# Patient Record
Sex: FEMALE | Race: WHITE | NOT HISPANIC OR LATINO | Employment: OTHER | URBAN - METROPOLITAN AREA
[De-identification: names, ages, dates, MRNs, and addresses within clinical notes are randomized per-mention and may not be internally consistent; named-entity substitution may affect disease eponyms.]

---

## 2017-04-03 ENCOUNTER — TELEPHONE (OUTPATIENT)
Dept: HEALTH INFORMATION MANAGEMENT | Facility: OTHER | Age: 82
End: 2017-04-03

## 2017-04-03 ENCOUNTER — PATIENT OUTREACH (OUTPATIENT)
Dept: HEALTH INFORMATION MANAGEMENT | Facility: OTHER | Age: 82
End: 2017-04-03

## 2017-04-03 NOTE — TELEPHONE ENCOUNTER
Patient is over the age of 65 and showing overdue for Pap Smear and Mammogram.    Please reply to this message as to whether these tests are appropriate and I will update the Health Maintenance Topic or contact the patient to schedule.

## 2017-04-27 ENCOUNTER — HOSPITAL ENCOUNTER (OUTPATIENT)
Dept: LAB | Facility: MEDICAL CENTER | Age: 82
End: 2017-04-27
Attending: NURSE PRACTITIONER
Payer: MEDICARE

## 2017-04-27 LAB
25(OH)D3 SERPL-MCNC: 17 NG/ML (ref 30–100)
ALBUMIN SERPL BCP-MCNC: 4 G/DL (ref 3.2–4.9)
ALBUMIN/GLOB SERPL: 1.4 G/DL
ALP SERPL-CCNC: 50 U/L (ref 30–99)
ALT SERPL-CCNC: 16 U/L (ref 2–50)
ANION GAP SERPL CALC-SCNC: 8 MMOL/L (ref 0–11.9)
AST SERPL-CCNC: 17 U/L (ref 12–45)
BASOPHILS # BLD AUTO: 0.8 % (ref 0–1.8)
BASOPHILS # BLD: 0.05 K/UL (ref 0–0.12)
BILIRUB SERPL-MCNC: 0.6 MG/DL (ref 0.1–1.5)
BUN SERPL-MCNC: 18 MG/DL (ref 8–22)
CALCIUM SERPL-MCNC: 9.4 MG/DL (ref 8.5–10.5)
CHLORIDE SERPL-SCNC: 108 MMOL/L (ref 96–112)
CHOLEST SERPL-MCNC: 205 MG/DL (ref 100–199)
CO2 SERPL-SCNC: 25 MMOL/L (ref 20–33)
CREAT SERPL-MCNC: 0.82 MG/DL (ref 0.5–1.4)
EOSINOPHIL # BLD AUTO: 0.11 K/UL (ref 0–0.51)
EOSINOPHIL NFR BLD: 1.7 % (ref 0–6.9)
ERYTHROCYTE [DISTWIDTH] IN BLOOD BY AUTOMATED COUNT: 46.7 FL (ref 35.9–50)
EST. AVERAGE GLUCOSE BLD GHB EST-MCNC: 114 MG/DL
FOLATE SERPL-MCNC: >23.3 NG/ML
GFR SERPL CREATININE-BSD FRML MDRD: >60 ML/MIN/1.73 M 2
GLOBULIN SER CALC-MCNC: 2.8 G/DL (ref 1.9–3.5)
GLUCOSE SERPL-MCNC: 98 MG/DL (ref 65–99)
HBA1C MFR BLD: 5.6 % (ref 0–5.6)
HCT VFR BLD AUTO: 41.8 % (ref 37–47)
HDLC SERPL-MCNC: 35 MG/DL
HGB BLD-MCNC: 14.2 G/DL (ref 12–16)
IMM GRANULOCYTES # BLD AUTO: 0.03 K/UL (ref 0–0.11)
IMM GRANULOCYTES NFR BLD AUTO: 0.5 % (ref 0–0.9)
LDLC SERPL CALC-MCNC: 134 MG/DL
LYMPHOCYTES # BLD AUTO: 2.45 K/UL (ref 1–4.8)
LYMPHOCYTES NFR BLD: 37.6 % (ref 22–41)
MCH RBC QN AUTO: 32.2 PG (ref 27–33)
MCHC RBC AUTO-ENTMCNC: 34 G/DL (ref 33.6–35)
MCV RBC AUTO: 94.8 FL (ref 81.4–97.8)
MONOCYTES # BLD AUTO: 0.49 K/UL (ref 0–0.85)
MONOCYTES NFR BLD AUTO: 7.5 % (ref 0–13.4)
NEUTROPHILS # BLD AUTO: 3.38 K/UL (ref 2–7.15)
NEUTROPHILS NFR BLD: 51.9 % (ref 44–72)
NRBC # BLD AUTO: 0 K/UL
NRBC BLD AUTO-RTO: 0 /100 WBC
PLATELET # BLD AUTO: 160 K/UL (ref 164–446)
PMV BLD AUTO: 11.6 FL (ref 9–12.9)
POTASSIUM SERPL-SCNC: 4.2 MMOL/L (ref 3.6–5.5)
PROT SERPL-MCNC: 6.8 G/DL (ref 6–8.2)
RBC # BLD AUTO: 4.41 M/UL (ref 4.2–5.4)
SODIUM SERPL-SCNC: 141 MMOL/L (ref 135–145)
T3FREE SERPL-MCNC: 2.81 PG/ML (ref 2.4–4.2)
T4 FREE SERPL-MCNC: 0.85 NG/DL (ref 0.53–1.43)
TRIGL SERPL-MCNC: 178 MG/DL (ref 0–149)
TSH SERPL DL<=0.005 MIU/L-ACNC: 3.91 UIU/ML (ref 0.3–3.7)
VIT B12 SERPL-MCNC: 711 PG/ML (ref 211–911)
WBC # BLD AUTO: 6.5 K/UL (ref 4.8–10.8)

## 2017-04-27 PROCEDURE — 83036 HEMOGLOBIN GLYCOSYLATED A1C: CPT

## 2017-04-27 PROCEDURE — 82607 VITAMIN B-12: CPT

## 2017-04-27 PROCEDURE — 84443 ASSAY THYROID STIM HORMONE: CPT

## 2017-04-27 PROCEDURE — 85025 COMPLETE CBC W/AUTO DIFF WBC: CPT

## 2017-04-27 PROCEDURE — 82306 VITAMIN D 25 HYDROXY: CPT

## 2017-04-27 PROCEDURE — 36415 COLL VENOUS BLD VENIPUNCTURE: CPT

## 2017-04-27 PROCEDURE — 80061 LIPID PANEL: CPT

## 2017-04-27 PROCEDURE — 84481 FREE ASSAY (FT-3): CPT

## 2017-04-27 PROCEDURE — 82746 ASSAY OF FOLIC ACID SERUM: CPT

## 2017-04-27 PROCEDURE — 80053 COMPREHEN METABOLIC PANEL: CPT

## 2017-04-27 PROCEDURE — 84439 ASSAY OF FREE THYROXINE: CPT

## 2018-01-24 ENCOUNTER — OFFICE VISIT (OUTPATIENT)
Dept: BEHAVIORAL HEALTH | Facility: PHYSICIAN GROUP | Age: 83
End: 2018-01-24
Payer: MEDICARE

## 2018-01-24 DIAGNOSIS — F41.8 SITUATIONAL ANXIETY: ICD-10-CM

## 2018-01-24 PROCEDURE — 90791 PSYCH DIAGNOSTIC EVALUATION: CPT | Performed by: SOCIAL WORKER
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
## 2018-02-15 ENCOUNTER — OFFICE VISIT (OUTPATIENT)
Dept: INTERNAL MEDICINE | Facility: MEDICAL CENTER | Age: 83
End: 2018-02-15
Payer: MEDICARE

## 2018-02-15 VITALS
SYSTOLIC BLOOD PRESSURE: 142 MMHG | HEART RATE: 72 BPM | HEIGHT: 65 IN | WEIGHT: 209.25 LBS | TEMPERATURE: 97.7 F | OXYGEN SATURATION: 93 % | DIASTOLIC BLOOD PRESSURE: 74 MMHG | BODY MASS INDEX: 34.86 KG/M2

## 2018-02-15 DIAGNOSIS — E55.9 VITAMIN D DEFICIENCY: ICD-10-CM

## 2018-02-15 DIAGNOSIS — R03.0 ELEVATED BLOOD PRESSURE READING: ICD-10-CM

## 2018-02-15 DIAGNOSIS — F33.0 MILD EPISODE OF RECURRENT MAJOR DEPRESSIVE DISORDER (HCC): ICD-10-CM

## 2018-02-15 DIAGNOSIS — Z12.39 SCREENING FOR BREAST CANCER: ICD-10-CM

## 2018-02-15 DIAGNOSIS — Z00.00 HEALTHCARE MAINTENANCE: ICD-10-CM

## 2018-02-15 DIAGNOSIS — Z13.820 SCREENING FOR OSTEOPOROSIS: ICD-10-CM

## 2018-02-15 DIAGNOSIS — G31.84 MCI (MILD COGNITIVE IMPAIRMENT): ICD-10-CM

## 2018-02-15 DIAGNOSIS — E66.09 CLASS 2 OBESITY DUE TO EXCESS CALORIES WITHOUT SERIOUS COMORBIDITY IN ADULT, UNSPECIFIED BMI: ICD-10-CM

## 2018-02-15 PROBLEM — F32.A DEPRESSION: Status: ACTIVE | Noted: 2018-02-15

## 2018-02-15 PROCEDURE — 99214 OFFICE O/P EST MOD 30 MIN: CPT | Performed by: INTERNAL MEDICINE

## 2018-02-15 RX ORDER — ASCORBIC ACID 500 MG
500 TABLET ORAL DAILY
COMMUNITY
End: 2021-07-20

## 2018-02-15 RX ORDER — PHENOL 1.4 %
AEROSOL, SPRAY (ML) MUCOUS MEMBRANE DAILY
COMMUNITY
End: 2021-07-20

## 2018-02-15 ASSESSMENT — PATIENT HEALTH QUESTIONNAIRE - PHQ9
SUM OF ALL RESPONSES TO PHQ QUESTIONS 1-9: 5
CLINICAL INTERPRETATION OF PHQ2 SCORE: 1
5. POOR APPETITE OR OVEREATING: 3 - NEARLY EVERY DAY

## 2018-02-15 ASSESSMENT — PAIN SCALES - GENERAL: PAINLEVEL: 8=MODERATE-SEVERE PAIN

## 2018-02-15 NOTE — PROGRESS NOTES
Geriatric Clinic Note  Patient: Kayode Winters  Age: 84 y.o.   Gender: female  Author: Kameron Benson M.D.  PCP: Kameron Benson M.D.    Today's date: 02/15/18  Chief Complaint   Patient presents with   • Establish Care   • Anxiety     Per pt she don't think she has but her dtrs do.    • Hip Pain     Left. Soreness. since 07/11/2013       HPI:  History obtained from patient, medical chart.  Coming in to [facility] as her daughters wanted to find her her a new provider.  She tells me her daughters think she has anxiety, however, she does not agree. She told me multiple times during the interview that she has lots of things to do and when asked what they were she told me they are painting her house, but she does not have to do any of this. She tells me she has been sad since   Saw Xiomy in behavioral health, visit went well, no other remarks about the visit, I am unable to review the note.    Endorses Good sleep,  Good appetite, Good interst in things. Good concentration, Feels guilty about not getting things done.   No psychotmotor agitaiton.   No thoughts of hurting herself.   Went to [facility], will request records    She is  Joh[employer] witness, she reads a lot. Just read a book on queen vicotria.    Not taking any pain medications currently.     Chronic Conditions:      ROS:  A 14 point ROS is negative, other than as stated above.     Past Medical History:   Diagnosis Date   • Hip pain 1/22/2014     Social History     Social History   • Marital status:      Spouse name: N/A   • Number of children: N/A   • Years of education: N/A     Occupational History   • Not on file.     Social History Main Topics   • Smoking status: Never Smoker   • Smokeless tobacco: Never Used   • Alcohol use 0.0 oz/week      Comment: rare.    • Drug use: Unknown   • Sexual activity: Not on file     Other Topics Concern   • Not on file     Social History Narrative   • No narrative on file     Family History   Problem  "Relation Age of Onset   • Depression Sister    • Genetic Neg Hx      Allergies: Novocain; Other food; Peanut-derived; and Red dye #40 [fd&c red #40]  No current outpatient prescriptions on file prior to visit.     No current facility-administered medications on file prior to visit.        Physical Exam:  /74   Pulse 72   Temp 36.5 °C (97.7 °F)   Ht 1.651 m (5' 5\")   Wt 94.9 kg (209 lb 4 oz)   SpO2 93%   BMI 34.82 kg/m²   Gen:NAD  HEENT:neck supple  CV:RRR 2+ radial pulses bilaterally  Lungs: normal Effort CAB  Abd:obese  Ext: no LE edema  Neuro: non focal  Psych: Does not appear to be responding to internal stimuli, anxious, not overtly depressed, affect normal.   Dementia Screening: MOCA 24/30 - deficits mainly in language and mild attention issues. She seemed to feel that she would not remember the delayed recall, but after encouragement she remember 4/5.  Finished some college    Delirium Screening. Negative    Labs: April TSH noted with normal T4, April CBC, and CMP WNL, lipid panel reviewed, b12 level and vitamin D level noted.     Assessment: 83 y/o F with [facility] based on MOCA today and family concern for anxiety, which may be depression    Plan:  [facility] - MOCA 24, independent in ADLs and IADls per patient report. Has family involved. Labs in April do not suggest reversible cause, but will recheck. Given her families perception of anxiety and guilt she may benefit from pharmacological therapy , along with her CBT that she is getting. Ask patient to consider SSRI and return in 4 weeks to discuss.  -continue CBT    Elevated BP - requested she take her BP at home, log provided. Most recent BP (2016/2015) within desired limits.     Vitamin D def - taking home supplement, encouraged cntinued use. Updated med record after patient left.     Obesity - encourage exercise and weight loss, her goal BMI is between 25-30.    Health care - underwent shared decision making and decided to continue to screen for breast " cancer, cholesterol, and osteoporosis.     Return in about 4 weeks (around 3/15/2018) for Long, discuss mood and lab results. .     Kameron Benson M.D.  Geriatric Physician

## 2018-02-15 NOTE — PATIENT INSTRUCTIONS
It was nice to meet you today.   Please continue to take your vitamin D.   We ordered some blood work and some imaging tests. Please do your best to get those at your earliest convinience. Next week is fine.     I think a mood medication may help. I would consider starting citalopram or escitalopram for this. Talk your daughters about this.     Check your blood pressure at home. Use the log I gave you and bring it to your next visit.     I recommend getting 1200mg of calcium in your diet.    If you are unable to do this, then, it's ok to supplement the rest.    Ideally you should take no more than 500 mg of Calcium at a time.   Here is a list of calcium rich foods (UpToDate, 2016):

## 2018-02-20 PROBLEM — E55.9 VITAMIN D DEFICIENCY: Status: ACTIVE | Noted: 2018-02-20

## 2018-02-26 ENCOUNTER — HOSPITAL ENCOUNTER (OUTPATIENT)
Dept: LAB | Facility: MEDICAL CENTER | Age: 83
End: 2018-02-26
Attending: INTERNAL MEDICINE
Payer: MEDICARE

## 2018-02-26 DIAGNOSIS — E66.09 CLASS 2 OBESITY DUE TO EXCESS CALORIES WITHOUT SERIOUS COMORBIDITY IN ADULT, UNSPECIFIED BMI: ICD-10-CM

## 2018-02-26 DIAGNOSIS — F33.0 MILD EPISODE OF RECURRENT MAJOR DEPRESSIVE DISORDER (HCC): ICD-10-CM

## 2018-02-26 DIAGNOSIS — Z00.00 HEALTHCARE MAINTENANCE: ICD-10-CM

## 2018-02-26 DIAGNOSIS — R03.0 ELEVATED BLOOD PRESSURE READING: ICD-10-CM

## 2018-02-26 LAB
25(OH)D3 SERPL-MCNC: 26 NG/ML (ref 30–100)
ALBUMIN SERPL BCP-MCNC: 4.2 G/DL (ref 3.2–4.9)
ALBUMIN/GLOB SERPL: 1.5 G/DL
ALP SERPL-CCNC: 43 U/L (ref 30–99)
ALT SERPL-CCNC: 15 U/L (ref 2–50)
ANION GAP SERPL CALC-SCNC: 10 MMOL/L (ref 0–11.9)
AST SERPL-CCNC: 15 U/L (ref 12–45)
BASOPHILS # BLD AUTO: 0.6 % (ref 0–1.8)
BASOPHILS # BLD: 0.04 K/UL (ref 0–0.12)
BILIRUB SERPL-MCNC: 0.6 MG/DL (ref 0.1–1.5)
BUN SERPL-MCNC: 21 MG/DL (ref 8–22)
CALCIUM SERPL-MCNC: 9.2 MG/DL (ref 8.5–10.5)
CHLORIDE SERPL-SCNC: 107 MMOL/L (ref 96–112)
CHOLEST SERPL-MCNC: 201 MG/DL (ref 100–199)
CO2 SERPL-SCNC: 23 MMOL/L (ref 20–33)
CREAT SERPL-MCNC: 0.96 MG/DL (ref 0.5–1.4)
EOSINOPHIL # BLD AUTO: 0.07 K/UL (ref 0–0.51)
EOSINOPHIL NFR BLD: 1 % (ref 0–6.9)
ERYTHROCYTE [DISTWIDTH] IN BLOOD BY AUTOMATED COUNT: 48.2 FL (ref 35.9–50)
GLOBULIN SER CALC-MCNC: 2.8 G/DL (ref 1.9–3.5)
GLUCOSE SERPL-MCNC: 93 MG/DL (ref 65–99)
HCT VFR BLD AUTO: 43.7 % (ref 37–47)
HDLC SERPL-MCNC: 32 MG/DL
HGB BLD-MCNC: 14.2 G/DL (ref 12–16)
IMM GRANULOCYTES # BLD AUTO: 0.02 K/UL (ref 0–0.11)
IMM GRANULOCYTES NFR BLD AUTO: 0.3 % (ref 0–0.9)
LDLC SERPL CALC-MCNC: 126 MG/DL
LYMPHOCYTES # BLD AUTO: 2.71 K/UL (ref 1–4.8)
LYMPHOCYTES NFR BLD: 40.3 % (ref 22–41)
MCH RBC QN AUTO: 31.7 PG (ref 27–33)
MCHC RBC AUTO-ENTMCNC: 32.5 G/DL (ref 33.6–35)
MCV RBC AUTO: 97.5 FL (ref 81.4–97.8)
MONOCYTES # BLD AUTO: 0.5 K/UL (ref 0–0.85)
MONOCYTES NFR BLD AUTO: 7.4 % (ref 0–13.4)
NEUTROPHILS # BLD AUTO: 3.38 K/UL (ref 2–7.15)
NEUTROPHILS NFR BLD: 50.4 % (ref 44–72)
NRBC # BLD AUTO: 0 K/UL
NRBC BLD-RTO: 0 /100 WBC
PLATELET # BLD AUTO: 149 K/UL (ref 164–446)
PMV BLD AUTO: 11.5 FL (ref 9–12.9)
POTASSIUM SERPL-SCNC: 4.3 MMOL/L (ref 3.6–5.5)
PROT SERPL-MCNC: 7 G/DL (ref 6–8.2)
RBC # BLD AUTO: 4.48 M/UL (ref 4.2–5.4)
SODIUM SERPL-SCNC: 140 MMOL/L (ref 135–145)
TRIGL SERPL-MCNC: 216 MG/DL (ref 0–149)
TSH SERPL DL<=0.005 MIU/L-ACNC: 4.66 UIU/ML (ref 0.38–5.33)
WBC # BLD AUTO: 6.7 K/UL (ref 4.8–10.8)

## 2018-02-26 PROCEDURE — 84443 ASSAY THYROID STIM HORMONE: CPT

## 2018-02-26 PROCEDURE — 80053 COMPREHEN METABOLIC PANEL: CPT

## 2018-02-26 PROCEDURE — 80061 LIPID PANEL: CPT

## 2018-02-26 PROCEDURE — 82306 VITAMIN D 25 HYDROXY: CPT

## 2018-02-26 PROCEDURE — 85025 COMPLETE CBC W/AUTO DIFF WBC: CPT

## 2018-02-26 PROCEDURE — 36415 COLL VENOUS BLD VENIPUNCTURE: CPT

## 2018-03-09 ENCOUNTER — OFFICE VISIT (OUTPATIENT)
Dept: BEHAVIORAL HEALTH | Facility: PHYSICIAN GROUP | Age: 83
End: 2018-03-09
Payer: MEDICARE

## 2018-03-09 DIAGNOSIS — F41.8 SITUATIONAL ANXIETY: ICD-10-CM

## 2018-03-09 PROCEDURE — 90834 PSYTX W PT 45 MINUTES: CPT | Performed by: SOCIAL WORKER

## 2018-03-20 ENCOUNTER — OFFICE VISIT (OUTPATIENT)
Dept: INTERNAL MEDICINE | Facility: MEDICAL CENTER | Age: 83
End: 2018-03-20
Payer: MEDICARE

## 2018-03-20 VITALS
BODY MASS INDEX: 35.24 KG/M2 | DIASTOLIC BLOOD PRESSURE: 76 MMHG | HEART RATE: 72 BPM | OXYGEN SATURATION: 95 % | HEIGHT: 65 IN | TEMPERATURE: 98.3 F | SYSTOLIC BLOOD PRESSURE: 150 MMHG | WEIGHT: 211.5 LBS

## 2018-03-20 DIAGNOSIS — F33.0 MILD EPISODE OF RECURRENT MAJOR DEPRESSIVE DISORDER (HCC): ICD-10-CM

## 2018-03-20 DIAGNOSIS — D69.6 THROMBOCYTOPENIA (HCC): ICD-10-CM

## 2018-03-20 DIAGNOSIS — R03.0 ELEVATED BLOOD PRESSURE READING: ICD-10-CM

## 2018-03-20 DIAGNOSIS — E55.9 VITAMIN D DEFICIENCY: ICD-10-CM

## 2018-03-20 PROCEDURE — 99214 OFFICE O/P EST MOD 30 MIN: CPT | Performed by: INTERNAL MEDICINE

## 2018-03-20 ASSESSMENT — PATIENT HEALTH QUESTIONNAIRE - PHQ9
5. POOR APPETITE OR OVEREATING: 2 - MORE THAN HALF THE DAYS
SUM OF ALL RESPONSES TO PHQ QUESTIONS 1-9: 6
CLINICAL INTERPRETATION OF PHQ2 SCORE: 0

## 2018-03-20 NOTE — PATIENT INSTRUCTIONS
Bring in your blood pressure cuff so we can make sure it works. If it does not work correctly you will need to get a new cuff and repeat the blood pressure log.     You need to start to exercise or walk your dog some more. This will help with your mood and your blood pressure.     Go get your blood work and I will let you know the results.

## 2018-03-21 NOTE — PROGRESS NOTES
Geriatric Clinic Note  Patient: Kayode Winters  Age: 84 y.o.   Gender: female  Author: Kameron Benson M.D.  PCP: Kameron Benson M.D.    Today's date: 03/21/18  Chief Complaint   Patient presents with   • Anxiety     Per pt she says her kids thinks she has an anxiety disorder   • Hypertension       HPI:  History obtained from patient, medical chart.    Patient is coming in for follow-up. I met her a month ago and she told me that her daughter thought she had a lot of anxiety. When I asked her about that today, she tells me that she feels that things are going okay with her anxiety. She is continuing to read and is finding enjoyment out of that. We did a PHQ9 on her and the MA documented the results. But upon discussion of the things that bother her the most she always feels like she just doesn't have enough time or is not doing things as well as that she could or used. She does not really want to start any medications for her mood at this point. She continues follow with behavioral health.     Chronic Conditions:  Thrombocytopenia - denies any bleeding hematochezia melena  Vitamin D def/post menopausal at risk for osteoporosis - pending DEXA scan and continues on vitamin D supplement.     ROS:  A 14 point ROS is negative, other than as stated above.     Past Medical History:   Diagnosis Date   • Hip pain 1/22/2014     Social History     Social History   • Marital status:      Spouse name: N/A   • Number of children: N/A   • Years of education: 2 year college     Occupational History   • Medical Technician      Social History Main Topics   • Smoking status: Never Smoker   • Smokeless tobacco: Never Used   • Alcohol use 0.0 oz/week      Comment: rare.    • Drug use: Unknown   • Sexual activity: Not on file     Other Topics Concern   • Not on file     Social History Narrative    Christianity    Walks twice a week for one hour at a time.     Lives alone, but family lives on the same block.      Family  "History   Problem Relation Age of Onset   • Depression Sister    • Genetic Neg Hx      Allergies: Novocain; Other food; Peanut-derived; and Red dye #40 [fd&c red #40]  Current Outpatient Prescriptions on File Prior to Visit   Medication Sig Dispense Refill   • vitamin D (CHOLECALCIFEROL) 1000 UNIT Tab Take 1 Tab by mouth every day. 60 Tab    • Multiple Vitamins-Minerals (MULTIVITAMIN ADULTS 50+) Tab Take  by mouth every day.     • ascorbic acid (ASCORBIC ACID) 500 MG Tab Take 500 mg by mouth every day.       No current facility-administered medications on file prior to visit.        Physical Exam:  /76   Pulse 72   Temp 36.8 °C (98.3 °F)   Ht 1.651 m (5' 5\")   Wt 95.9 kg (211 lb 8 oz)   SpO2 95%   BMI 35.20 kg/m² Body mass index is 35.2 kg/m².    Gen: NAD, some mild memory issues.   HEENT: neck supple  CV:RRR no mrmur  Lungs:CAB, normal effort  Abd:,  Ext: no c/c/e  Neuro: non focal, no sensory deficits noted  Psych: Does not appear to be responding to internal stimuli, not overtyly depressed  Dementia Screening:  Delirium Screening.      Assessment:    Plan:  1. Thrombocytopenia (CMS-HCC)  Very mild. We'll repeat CBC and check her iron levels. Counseled on bleeding precautions   CBC WITH DIFFERENTIAL    IRON/TOTAL IRON BIND    FERRITIN   2. 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 Patient's symptoms are stable she does not want to start a medication. I encouraged her to exercise again in the sun; help with her mood she continues to follow with behavioral health    3. Vitamin D deficiency  Continue replacement    4. Elevated blood pressure reading  Her blood pressure cuff is from her late 's and is quite old. I requested she get a new one. I put in a DME order that perhaps she can get one provided tumor free of charge if she qualifies I gave her a blood pressure log and requested that she bring it in at the next visit. I considered ambulatory blood " pressure monitoring however it is difficult to order her current system so we will make sure she gets a better machine and then continue on from there.      Healthcare maintenance  Requested she get the mammogram and bone density scan ordered at last visit. We did not discuss vaccines today  Return if symptoms worsen or fail to improve, for Long, BP/anxiety f/u. Follow-up in 2 months otherwise; requested that she bring a family with history of failure available    Kameron Benson M.D.  Geriatric Physician    This note was partially dictated with voice recognition software, for any confusion please do not hesitate to contact me.

## 2018-03-26 ENCOUNTER — HOSPITAL ENCOUNTER (OUTPATIENT)
Dept: LAB | Facility: MEDICAL CENTER | Age: 83
End: 2018-03-26
Attending: INTERNAL MEDICINE
Payer: MEDICARE

## 2018-03-26 DIAGNOSIS — D69.6 THROMBOCYTOPENIA (HCC): ICD-10-CM

## 2018-03-26 LAB
BASOPHILS # BLD AUTO: 0.4 % (ref 0–1.8)
BASOPHILS # BLD: 0.03 K/UL (ref 0–0.12)
EOSINOPHIL # BLD AUTO: 0.09 K/UL (ref 0–0.51)
EOSINOPHIL NFR BLD: 1.2 % (ref 0–6.9)
ERYTHROCYTE [DISTWIDTH] IN BLOOD BY AUTOMATED COUNT: 47.9 FL (ref 35.9–50)
FERRITIN SERPL-MCNC: 158 NG/ML (ref 10–291)
HCT VFR BLD AUTO: 42.1 % (ref 37–47)
HGB BLD-MCNC: 14 G/DL (ref 12–16)
IMM GRANULOCYTES # BLD AUTO: 0.02 K/UL (ref 0–0.11)
IMM GRANULOCYTES NFR BLD AUTO: 0.3 % (ref 0–0.9)
IRON SATN MFR SERPL: 34 % (ref 15–55)
IRON SERPL-MCNC: 111 UG/DL (ref 40–170)
LYMPHOCYTES # BLD AUTO: 3.14 K/UL (ref 1–4.8)
LYMPHOCYTES NFR BLD: 43.6 % (ref 22–41)
MCH RBC QN AUTO: 32 PG (ref 27–33)
MCHC RBC AUTO-ENTMCNC: 33.3 G/DL (ref 33.6–35)
MCV RBC AUTO: 96.3 FL (ref 81.4–97.8)
MONOCYTES # BLD AUTO: 0.51 K/UL (ref 0–0.85)
MONOCYTES NFR BLD AUTO: 7.1 % (ref 0–13.4)
NEUTROPHILS # BLD AUTO: 3.42 K/UL (ref 2–7.15)
NEUTROPHILS NFR BLD: 47.4 % (ref 44–72)
NRBC # BLD AUTO: 0 K/UL
NRBC BLD-RTO: 0 /100 WBC
PLATELET # BLD AUTO: 177 K/UL (ref 164–446)
PMV BLD AUTO: 10.6 FL (ref 9–12.9)
RBC # BLD AUTO: 4.37 M/UL (ref 4.2–5.4)
TIBC SERPL-MCNC: 330 UG/DL (ref 250–450)
WBC # BLD AUTO: 7.2 K/UL (ref 4.8–10.8)

## 2018-03-26 PROCEDURE — 83540 ASSAY OF IRON: CPT

## 2018-03-26 PROCEDURE — 36415 COLL VENOUS BLD VENIPUNCTURE: CPT

## 2018-03-26 PROCEDURE — 83550 IRON BINDING TEST: CPT

## 2018-03-26 PROCEDURE — 82728 ASSAY OF FERRITIN: CPT

## 2018-03-26 PROCEDURE — 85025 COMPLETE CBC W/AUTO DIFF WBC: CPT

## 2018-08-06 ENCOUNTER — OFFICE VISIT (OUTPATIENT)
Dept: INTERNAL MEDICINE | Facility: MEDICAL CENTER | Age: 83
End: 2018-08-06
Payer: MEDICARE

## 2018-08-06 VITALS
WEIGHT: 202 LBS | SYSTOLIC BLOOD PRESSURE: 142 MMHG | HEIGHT: 65 IN | OXYGEN SATURATION: 95 % | TEMPERATURE: 98.2 F | DIASTOLIC BLOOD PRESSURE: 78 MMHG | BODY MASS INDEX: 33.66 KG/M2 | HEART RATE: 63 BPM

## 2018-08-06 DIAGNOSIS — R03.0 ELEVATED BLOOD PRESSURE READING: ICD-10-CM

## 2018-08-06 PROCEDURE — 99213 OFFICE O/P EST LOW 20 MIN: CPT | Mod: GE | Performed by: INTERNAL MEDICINE

## 2018-08-06 NOTE — PROGRESS NOTES
"      Established Patient    Kayode presents today with the following:    CC: Blood pressure check  PCP: Dr Nica Benson MD      HPI:Ms. Winters presents today with her son for follow-up on blood pressure. She had checked blood pressures at home frequently. She did not bring the blood pressure log today but states that highest number she has seen was 152/86. Usually the systolic blood pressure is around 140s and occasionally 150s. She denies any headache, syncope, chest pain. No prior history of high blood pressure treated with medications. Denies any history of heart disease, stroke, PVD.   her son also states that when she is not in a familiar environment she gets anxious, agitated/confused, but she does well at home. She denies worsening depression or anxiety. She was seen by a therapist before and she stopped seeing her since it does not helping much. She is able to perform her ADLs/IADLs. Her son states that they haven't noted any memory issues.     Patient Active Problem List    Diagnosis Date Noted   • Thrombocytopenia (HCC) 03/20/2018   • Patient is Episcopalian 02/20/2018   • Vitamin D deficiency 02/20/2018   • Depression 02/15/2018   • Elevated blood pressure reading 02/15/2018   • Low back pain 10/25/2016   • Hip pain 01/22/2014   • Obesity 01/22/2014       Current Outpatient Prescriptions   Medication Sig Dispense Refill   • vitamin D (CHOLECALCIFEROL) 1000 UNIT Tab Take 1 Tab by mouth every day. 60 Tab    • Multiple Vitamins-Minerals (MULTIVITAMIN ADULTS 50+) Tab Take  by mouth every day.     • ascorbic acid (ASCORBIC ACID) 500 MG Tab Take 500 mg by mouth every day.       No current facility-administered medications for this visit.        ROS: As per HPI. Additional pertinent symptoms as noted below.    /78   Pulse 63   Temp 36.8 °C (98.2 °F)   Ht 1.651 m (5' 5\")   Wt 91.6 kg (202 lb)   SpO2 95%   BMI 33.61 kg/m²     Physical Exam   Constitutional:  oriented to person, place, and time. No " distress.   Eyes: Pupils are equal, round, and reactive to light. No scleral icterus.  Neck: Neck supple. No thyromegaly present.   Cardiovascular: Normal rate, regular rhythm and normal heart sounds.  Exam reveals no gallop and no friction rub.  No murmur heard.  Pulmonary/Chest: Breath sounds normal. Chest wall is not dull to percussion.   Musculoskeletal:   no edema.   Lymphadenopathy: no cervical adenopathy  Neurological: alert and oriented to person, place, and time.   Skin: No cyanosis. Nails show no clubbing.    Note: I have reviewed all pertinent labs and diagnostic tests associated with this visit with specific comments listed under the assessment and plan below    Assessment and Plan    1. Elevated blood pressure reading  She had one blood pressure reading at the office during previous visit 150/76 millimeters mercury. Her home blood pressure readings mostly on 140s with occasional 150s highest being 152.   Since she does not have major comorbidities (CAD/stroke/CKD/ diabetes) attempting to lower her blood pressure will be riskier than beneficial.   Discussed this with patient and she agrees with lifestyle modification including low salt diet.   Recommended to continue to keep the blood pressure log and bring it during next visit.   Follow-up with PCP in 3 months    Discussed behavioral changes in regards to anxiety in unfamiliar environment. Follow up with PCP.     Followup: Return in about 3 months (around 11/6/2018). With PCP      Signed by: Dawson Martinez M.D.

## 2018-11-12 ENCOUNTER — OFFICE VISIT (OUTPATIENT)
Dept: INTERNAL MEDICINE | Facility: MEDICAL CENTER | Age: 83
End: 2018-11-12
Payer: MEDICARE

## 2018-11-12 VITALS
HEART RATE: 74 BPM | SYSTOLIC BLOOD PRESSURE: 126 MMHG | BODY MASS INDEX: 33.49 KG/M2 | DIASTOLIC BLOOD PRESSURE: 66 MMHG | TEMPERATURE: 98.4 F | OXYGEN SATURATION: 96 % | HEIGHT: 65 IN | WEIGHT: 201 LBS

## 2018-11-12 DIAGNOSIS — K08.89 PAIN IN TOOTH: ICD-10-CM

## 2018-11-12 DIAGNOSIS — E66.09 CLASS 2 OBESITY DUE TO EXCESS CALORIES WITHOUT SERIOUS COMORBIDITY IN ADULT, UNSPECIFIED BMI: ICD-10-CM

## 2018-11-12 DIAGNOSIS — E55.9 VITAMIN D DEFICIENCY: ICD-10-CM

## 2018-11-12 DIAGNOSIS — R03.0 ELEVATED BLOOD PRESSURE READING: ICD-10-CM

## 2018-11-12 DIAGNOSIS — D69.6 THROMBOCYTOPENIA (HCC): ICD-10-CM

## 2018-11-12 PROCEDURE — 99214 OFFICE O/P EST MOD 30 MIN: CPT | Performed by: INTERNAL MEDICINE

## 2018-11-12 RX ORDER — AMOXICILLIN 500 MG/1
CAPSULE ORAL
Refills: 0 | COMMUNITY
Start: 2018-10-10 | End: 2021-07-20

## 2018-11-12 NOTE — PROGRESS NOTES
Geriatric Clinic Note  Patient: Kayode Winters  Age: 85 y.o.   Gender: female  Author: Kameron Benson M.D.  PCP: Kameron Benson M.D.    Today's date: 11/12/18  Chief Complaint   Patient presents with   • Hypertension     Follow up   • Vitamin D Deficiency       HPI:  History obtained from patient, medical chart.    Feels okay.     BP log. Does not want meds.     Did not recall talking about  Mammogram, but will get them now.    No recent falls noticed with incontinence completely independent in ADLs and IADLs.  Continues to read books dealing with her being a Protestant.  She recently started learning [employer].    Recently had some tooth pain on the back bottom right.  She is on amoxicillin.  She had a root canal that to she is following up with a dentist next week.  Denies any fevers or chills or diarrhea.  Assessment: 85-year-old female coming in for general follow-up    Plan:  1. Vitamin D deficiency  Continue supplement    2. Elevated blood pressure reading  Patient brought in blood pressure log range.  Highest BP noted was 154/84.  She has multiple readings in the 130s over 70s.  We will scan this log into the chart.  We will she will not monitor his much at home.  Patient does not want to take medication that seems reasonable given her age.  - CBC WITHOUT DIFFERENTIAL; Future  - COMP METABOLIC PANEL; Future    3. Class 2 obesity due to excess calories without serious comorbidity in adult, unspecified BMI  Encouraged her to walk more.  She is walking her dog a few times a week about 4 blocks.  I told her that 1 of the best thing she can do stay active    4. Thrombocytopenia (HCC)  Overall resolved however will repeat  before next follow-up visit  - CBC WITHOUT DIFFERENTIAL; Future    Tooth infection  -Follow-up with dentistry on amoxicillin    Health Maintenance: Will get DEXA mammo-  Immunizations:   Immunization History   Administered Date(s) Administered   • Influenza Seasonal Injectable 10/10/2012  "  • Influenza Vaccine Adult HD 10/04/2013, 11/03/2014, 10/06/2015, 11/03/2016, 09/25/2017, 09/17/2018   • Pneumococcal Conjugate Vaccine (Prevnar/PCV-13) 10/06/2015   • Pneumococcal polysaccharide vaccine (PPSV-23) 11/09/2007     Return in about 6 months (around 5/12/2019).     ROS:    A 14 point ROS is negative, other than as stated above.     Past Medical History:   Diagnosis Date   • Hip pain 1/22/2014   • Mild depression (HCC)      Social History     Social History   • Marital status:      Spouse name: N/A   • Number of children: N/A   • Years of education: 2 year college     Occupational History   • Medical Technician      Social History Main Topics   • Smoking status: Never Smoker   • Smokeless tobacco: Never Used   • Alcohol use 0.0 oz/week      Comment: rare.    • Drug use: Unknown   • Sexual activity: Not on file     Other Topics Concern   • Not on file     Social History Narrative    Caodaism    Walks twice a week for one hour at a time.     Lives alone, but family lives on the same block.      Family History   Problem Relation Age of Onset   • Depression Sister    • Genetic Neg Hx      Allergies: Novocain; Other food; Peanut-derived; and Red dye #40 [fd&c red #40]  Current Outpatient Prescriptions on File Prior to Visit   Medication Sig Dispense Refill   • vitamin D (CHOLECALCIFEROL) 1000 UNIT Tab Take 1 Tab by mouth every day. 60 Tab    • Multiple Vitamins-Minerals (MULTIVITAMIN ADULTS 50+) Tab Take  by mouth every day.     • ascorbic acid (ASCORBIC ACID) 500 MG Tab Take 500 mg by mouth every day.       No current facility-administered medications on file prior to visit.        Physical Exam:  /66 (BP Location: Left arm, Patient Position: Sitting, BP Cuff Size: Large adult)   Pulse 74   Temp 36.9 °C (98.4 °F) (Temporal)   Ht 1.651 m (5' 5\")   Wt 91.2 kg (201 lb)   SpO2 96%   BMI 33.45 kg/m² Body mass index is 33.45 kg/m².    Gen: No acute distress, pleasant, alert  HEENT: Neck " supple  CV: Regular rate and rhythm, no rubs or gallops, 2+ radial pulses bilaterally  Lungs: Normal effort, clear to auscultation bilateral  Abd: Obese, soft  Ext: Osteoporotic changes noted to have bilateral  Neuro:, Does have some slowness with standing up from a chair  Psych: Does not appear to be responding to internal stimuli    Labs: Reviewed labs from back in March and April 2018  No EKG on file    Kameron Benson M.D.  Geriatric Physician    This note was partially dictated with voice recognition software, for any confusion please do not hesitate to contact me.

## 2019-01-20 ENCOUNTER — APPOINTMENT (OUTPATIENT)
Dept: RADIOLOGY | Facility: MEDICAL CENTER | Age: 84
End: 2019-01-20
Attending: EMERGENCY MEDICINE
Payer: MEDICARE

## 2019-01-20 ENCOUNTER — HOSPITAL ENCOUNTER (EMERGENCY)
Facility: MEDICAL CENTER | Age: 84
End: 2019-01-20
Attending: EMERGENCY MEDICINE
Payer: MEDICARE

## 2019-01-20 VITALS
OXYGEN SATURATION: 95 % | BODY MASS INDEX: 32.14 KG/M2 | TEMPERATURE: 97.6 F | WEIGHT: 200 LBS | RESPIRATION RATE: 18 BRPM | SYSTOLIC BLOOD PRESSURE: 154 MMHG | HEIGHT: 66 IN | DIASTOLIC BLOOD PRESSURE: 70 MMHG | HEART RATE: 72 BPM

## 2019-01-20 DIAGNOSIS — E86.0 DEHYDRATION: ICD-10-CM

## 2019-01-20 DIAGNOSIS — R53.1 WEAKNESS: ICD-10-CM

## 2019-01-20 LAB
ALBUMIN SERPL BCP-MCNC: 3.9 G/DL (ref 3.2–4.9)
ALBUMIN/GLOB SERPL: 1.4 G/DL
ALP SERPL-CCNC: 46 U/L (ref 30–99)
ALT SERPL-CCNC: 10 U/L (ref 2–50)
ANION GAP SERPL CALC-SCNC: 7 MMOL/L (ref 0–11.9)
APPEARANCE UR: CLEAR
APTT PPP: 32.3 SEC (ref 24.7–36)
AST SERPL-CCNC: 13 U/L (ref 12–45)
BACTERIA #/AREA URNS HPF: NEGATIVE /HPF
BASOPHILS # BLD AUTO: 0.3 % (ref 0–1.8)
BASOPHILS # BLD: 0.02 K/UL (ref 0–0.12)
BILIRUB SERPL-MCNC: 0.6 MG/DL (ref 0.1–1.5)
BILIRUB UR QL STRIP.AUTO: NEGATIVE
BNP SERPL-MCNC: 31 PG/ML (ref 0–100)
BUN SERPL-MCNC: 18 MG/DL (ref 8–22)
CALCIUM SERPL-MCNC: 9.6 MG/DL (ref 8.5–10.5)
CHLORIDE SERPL-SCNC: 107 MMOL/L (ref 96–112)
CO2 SERPL-SCNC: 25 MMOL/L (ref 20–33)
COLOR UR: YELLOW
CREAT SERPL-MCNC: 0.98 MG/DL (ref 0.5–1.4)
EKG IMPRESSION: NORMAL
EOSINOPHIL # BLD AUTO: 0.1 K/UL (ref 0–0.51)
EOSINOPHIL NFR BLD: 1.4 % (ref 0–6.9)
EPI CELLS #/AREA URNS HPF: NEGATIVE /HPF
ERYTHROCYTE [DISTWIDTH] IN BLOOD BY AUTOMATED COUNT: 46.8 FL (ref 35.9–50)
GLOBULIN SER CALC-MCNC: 2.7 G/DL (ref 1.9–3.5)
GLUCOSE SERPL-MCNC: 99 MG/DL (ref 65–99)
GLUCOSE UR STRIP.AUTO-MCNC: NEGATIVE MG/DL
HCT VFR BLD AUTO: 39.6 % (ref 37–47)
HGB BLD-MCNC: 13.5 G/DL (ref 12–16)
HYALINE CASTS #/AREA URNS LPF: ABNORMAL /LPF
IMM GRANULOCYTES # BLD AUTO: 0.03 K/UL (ref 0–0.11)
IMM GRANULOCYTES NFR BLD AUTO: 0.4 % (ref 0–0.9)
INR PPP: 1.13 (ref 0.87–1.13)
KETONES UR STRIP.AUTO-MCNC: NEGATIVE MG/DL
LEUKOCYTE ESTERASE UR QL STRIP.AUTO: NEGATIVE
LIPASE SERPL-CCNC: 19 U/L (ref 11–82)
LYMPHOCYTES # BLD AUTO: 1.78 K/UL (ref 1–4.8)
LYMPHOCYTES NFR BLD: 25.4 % (ref 22–41)
MCH RBC QN AUTO: 32.5 PG (ref 27–33)
MCHC RBC AUTO-ENTMCNC: 34.1 G/DL (ref 33.6–35)
MCV RBC AUTO: 95.2 FL (ref 81.4–97.8)
MICRO URNS: ABNORMAL
MONOCYTES # BLD AUTO: 0.58 K/UL (ref 0–0.85)
MONOCYTES NFR BLD AUTO: 8.3 % (ref 0–13.4)
NEUTROPHILS # BLD AUTO: 4.5 K/UL (ref 2–7.15)
NEUTROPHILS NFR BLD: 64.2 % (ref 44–72)
NITRITE UR QL STRIP.AUTO: NEGATIVE
NRBC # BLD AUTO: 0 K/UL
NRBC BLD-RTO: 0 /100 WBC
PH UR STRIP.AUTO: 5 [PH]
PLATELET # BLD AUTO: 186 K/UL (ref 164–446)
PMV BLD AUTO: 8.9 FL (ref 9–12.9)
POTASSIUM SERPL-SCNC: 4.1 MMOL/L (ref 3.6–5.5)
PROT SERPL-MCNC: 6.6 G/DL (ref 6–8.2)
PROT UR QL STRIP: NEGATIVE MG/DL
PROTHROMBIN TIME: 14.7 SEC (ref 12–14.6)
RBC # BLD AUTO: 4.16 M/UL (ref 4.2–5.4)
RBC # URNS HPF: ABNORMAL /HPF
RBC UR QL AUTO: ABNORMAL
SODIUM SERPL-SCNC: 139 MMOL/L (ref 135–145)
SP GR UR STRIP.AUTO: 1.02
TROPONIN I SERPL-MCNC: <0.01 NG/ML (ref 0–0.04)
UROBILINOGEN UR STRIP.AUTO-MCNC: 0.2 MG/DL
WBC # BLD AUTO: 7 K/UL (ref 4.8–10.8)
WBC #/AREA URNS HPF: ABNORMAL /HPF

## 2019-01-20 PROCEDURE — 93005 ELECTROCARDIOGRAM TRACING: CPT

## 2019-01-20 PROCEDURE — 51701 INSERT BLADDER CATHETER: CPT

## 2019-01-20 PROCEDURE — 80053 COMPREHEN METABOLIC PANEL: CPT

## 2019-01-20 PROCEDURE — 85025 COMPLETE CBC W/AUTO DIFF WBC: CPT

## 2019-01-20 PROCEDURE — 84484 ASSAY OF TROPONIN QUANT: CPT

## 2019-01-20 PROCEDURE — 71045 X-RAY EXAM CHEST 1 VIEW: CPT

## 2019-01-20 PROCEDURE — 93005 ELECTROCARDIOGRAM TRACING: CPT | Performed by: EMERGENCY MEDICINE

## 2019-01-20 PROCEDURE — 81001 URINALYSIS AUTO W/SCOPE: CPT

## 2019-01-20 PROCEDURE — 99284 EMERGENCY DEPT VISIT MOD MDM: CPT

## 2019-01-20 PROCEDURE — 83690 ASSAY OF LIPASE: CPT

## 2019-01-20 PROCEDURE — 83880 ASSAY OF NATRIURETIC PEPTIDE: CPT

## 2019-01-20 PROCEDURE — 85730 THROMBOPLASTIN TIME PARTIAL: CPT

## 2019-01-20 PROCEDURE — 85610 PROTHROMBIN TIME: CPT

## 2019-01-20 NOTE — ED TRIAGE NOTES
Chief Complaint   Patient presents with   • Weakness     generalized , onset this morning   • Pain     left armpit and arm area.    • Digit Pain     unable to close left hand ,unable to bend fingers     Pt bib ems from home, pt c/o onset of generalized weakness started after waking up this morning. She also c/o pain under her left armpit .I noticed pt has weaker  on her left hand, she said that she is having problem closing her hand . Equal strength lower extremities, no facial droop or slurred speech.   Pt currently taking Amoxicillin for dental prophylaxis, family thought that she was having an allergic reaction and given patient Benadryl 25mg 0900.

## 2019-01-20 NOTE — ED PROVIDER NOTES
ED Provider Note    Scribed for Colt Jennings M.D. by Juan Diaz. 1/20/2019  3:29 PM    Primary care provider: Kameron Benson M.D.  Means of arrival: Ambulance  History obtained from: Patient  History limited by: None    CHIEF COMPLAINT  Chief Complaint   Patient presents with   • Weakness     generalized , onset this morning   • Pain     left armpit and arm area.    • Digit Pain     unable to close left hand ,unable to bend fingers       HPI  Kayode Winters is a 85 y.o. female who presents to the Emergency Department for evaluation of generalized body aches onset this morning upon waking. She additionally endorses mild pain localized to her left armpit, but denies associated chest pain, shortness of breath, leg swelling, or syncope. The patient reports to have been feeling fine yesterday, but started a course of Amoxicillin yesterday in preparation of tooth extraction that is scheduled in the next few days. She denies any diarrhea, abdominal pain, or vomiting. The patent has received her influenza and pneumonia vaccinations this season. No alleviating or exacerbating factors are identified at this time.     REVIEW OF SYSTEMS  Pertinent negatives include no chest pain, shortness of breath, leg swelling, syncope, diarrhea, abdominal pain, or vomiting. As above, all other systems reviewed and are negative.   See HPI for further details.     PAST MEDICAL HISTORY   has a past medical history of Hip pain (1/22/2014) and Mild depression ([facility]).    SURGICAL HISTORY   has a past surgical history that includes appendectomy.    SOCIAL HISTORY  Social History   Substance Use Topics   • Smoking status: Never Smoker   • Smokeless tobacco: Never Used   • Alcohol use 0.0 oz/week      Comment: rare.       History   Drug use: Unknown       FAMILY HISTORY  Family History   Problem Relation Age of Onset   • Depression Sister    • Genetic Neg Hx        CURRENT MEDICATIONS  Home Medications     Reviewed by Malinda Fang R.N.  "(Registered Nurse) on 01/20/19 at 1449  Med List Status: Complete   Medication Last Dose Status   amoxicillin (AMOXIL) 500 MG Cap 1/20/2019 Active   ascorbic acid (ASCORBIC ACID) 500 MG Tab 1/20/2019 Active   Multiple Vitamins-Minerals (MULTIVITAMIN ADULTS 50+) Tab 1/20/2019 Active   vitamin D (CHOLECALCIFEROL) 1000 UNIT Tab 1/20/2019 Active                ALLERGIES  Allergies   Allergen Reactions   • Novocain Contact Dermatitis   • Other Food Contact Dermatitis     Pt is allergic to peanuts and peanut products.   • Bloodless    • Peanut-Derived    • Red Dye #40 [Fd&C Red #40]        PHYSICAL EXAM  VITAL SIGNS: /61   Pulse 70   Temp 36.4 °C (97.6 °F) (Temporal)   Resp 18   Ht 1.676 m (5' 6\")   Wt 90.7 kg (200 lb)   SpO2 95%   BMI 32.28 kg/m²   Constitutional: Well developed, Well nourished, No acute distress, Non-toxic appearance.   HENT: Normocephalic, Atraumatic, Bilateral external ears normal, Oropharynx is clear mucous membranes are moist. No oral exudates or nasal discharge.   Eyes: Pupils are equal round and reactive, EOMI, Conjunctiva normal, No discharge.   Neck: Normal range of motion, No tenderness, Supple, No stridor. No meningismus.  Lymphatic: No lymphadenopathy noted.   Cardiovascular: Regular rate and rhythm without murmur rub or gallop.  Thorax & Lungs: Basilar rales. There is no chest wall tenderness.   Abdomen: Soft non-tender non-distended. There is no rebound or guarding. No organomegaly is appreciated. Bowel sounds are normal.  Skin: Normal without rash.   Back: No CVA or spinal tenderness.   Extremities: Intact distal pulses, No edema, No tenderness, No cyanosis, No clubbing. Capillary refill is less than 2 seconds.  Musculoskeletal: Good range of motion in all major joints. No tenderness to palpation or major deformities noted.   Neurologic: Alert & oriented x 3, Normal motor function, Normal sensory function, No focal deficits noted. Reflexes are normal. Unable to flex left " middle finger, consistent with trigger finger.   Psychiatric: Affect normal, Judgment normal, Mood normal. There is no suicidal ideation or patient reported hallucinations.     DIAGNOSTIC STUDIES / PROCEDURES    LABS  Labs Reviewed   CBC WITH DIFFERENTIAL - Abnormal; Notable for the following:        Result Value    RBC 4.16 (*)     MPV 8.9 (*)     All other components within normal limits    Narrative:     Indicate which anticoagulants the patient is on:->UNKNOWN   PROTHROMBIN TIME - Abnormal; Notable for the following:     PT 14.7 (*)     All other components within normal limits    Narrative:     Indicate which anticoagulants the patient is on:->UNKNOWN   ESTIMATED GFR - Abnormal; Notable for the following:     GFR If Non  54 (*)     All other components within normal limits    Narrative:     Indicate which anticoagulants the patient is on:->UNKNOWN   URINALYSIS,CULTURE IF INDICATED - Abnormal; Notable for the following:     Occult Blood Trace (*)     All other components within normal limits   URINE MICROSCOPIC (W/UA) - Abnormal; Notable for the following:     Hyaline Cast 3-5 (*)     All other components within normal limits   TROPONIN    Narrative:     Indicate which anticoagulants the patient is on:->UNKNOWN   BTYPE NATRIURETIC PEPTIDE    Narrative:     Indicate which anticoagulants the patient is on:->UNKNOWN   COMP METABOLIC PANEL    Narrative:     Indicate which anticoagulants the patient is on:->UNKNOWN   APTT    Narrative:     Indicate which anticoagulants the patient is on:->UNKNOWN   LIPASE    Narrative:     Indicate which anticoagulants the patient is on:->UNKNOWN      All labs reviewed by me.    EKG Interpretation:  Results for orders placed or performed during the hospital encounter of 01/20/19   EKG   Result Value Ref Range    Report       [facility] Emergency Dept.    Test Date:  2019-01-20  Pt Name:    KRISHNA HUERTA                   Department: ER  MRN:         [id number]                      Room:        28  Gender:     Female                       Technician: [id number]  :        [id number]                   Requested By:ER TRIAGE PROTOCOL  Order #:    [id number]                    Reading MD: SANDRA WADDELL MD    Measurements  Intervals                                Axis  Rate:       68                           P:          54  HI:         168                          QRS:        13  QRSD:       68                           T:          11  QT:         388  QTc:        413    Interpretive Statements  SINUS RHYTHM  Compared to ECG 2012 18:53:17  Myocardial infarct finding no longer present    Electronically Signed On 2019 16:37:27 PST by SANDRA WADDELL MD         RADIOLOGY  DX-CHEST-LIMITED (1 VIEW)   Final Result         No acute cardiac or pulmonary abnormality is identified.        The radiologist's interpretation of all radiological studies have been reviewed by me.    COURSE & MEDICAL DECISION MAKING  Nursing notes, VS, PMSFHx reviewed in chart.    3:29 PM Patient seen and examined at bedside. Ordered for DX-Chest, Troponin, BNP, CBC with differential, CMP, PTT, APTT, Lipase, Estimated GFR, and EKG to evaluate.     EKG is unremarkable showing no evidence of acute ischemic changes or dysrhythmia.    4:46 PM - Reviewed patient's lab and radiology results as shown above.  No leukocytosis, shift, anemia, electrolyte derangements or acidosis or urinary tract infection is seen.  She will be road tested.     5:03 PM - Nursing staff informed me patient was able to ambulate at baseline.    5:04 PM - Patient was reevaluated at bedside. She is resting comfortably in bed with stable vital signs. Discussed lab and radiology results with the patient and family as shown above. The patient and her family were informed the patient is stable for discharge, but she will require close follow up with PCP. She is instructed to return to the ED for worsening weakness, high fever, or  any other concerning symptoms. Patient is understanding and agreeable to discharge.     Patient has had high blood pressure while in the emergency department, felt likely secondary to medical condition. Counseled patient to monitor blood pressure at home and follow up with primary care physician.      The patient will return for new or worsening symptoms and is stable at the time of discharge.    DISPOSITION:  Patient will be discharged home in stable condition.    FINAL IMPRESSION  1. Weakness    2. Dehydration          Juan ZAMORANO (Scribe), am scribing for, and in the presence of, Colt Jennings M.D..    Electronically signed by: Juan Diaz (Scribwen), 1/20/2019    Colt ZAMORANO M.D. personally performed the services described in this documentation, as scribed by Juan Diaz in my presence, and it is both accurate and complete.    C.    The note accurately reflects work and decisions made by me.  Colt Jennings  1/20/2019  5:13 PM

## 2019-01-21 ENCOUNTER — OFFICE VISIT (OUTPATIENT)
Dept: INTERNAL MEDICINE | Facility: MEDICAL CENTER | Age: 84
End: 2019-01-21
Payer: MEDICARE

## 2019-01-21 VITALS
WEIGHT: 198 LBS | BODY MASS INDEX: 31.82 KG/M2 | OXYGEN SATURATION: 93 % | HEART RATE: 85 BPM | TEMPERATURE: 99.3 F | DIASTOLIC BLOOD PRESSURE: 68 MMHG | SYSTOLIC BLOOD PRESSURE: 126 MMHG | HEIGHT: 66 IN

## 2019-01-21 DIAGNOSIS — K08.409 HISTORY OF TOOTH EXTRACTION, UNSPECIFIED EDENTULISM CLASS: ICD-10-CM

## 2019-01-21 DIAGNOSIS — F32.A DEPRESSION, UNSPECIFIED DEPRESSION TYPE: ICD-10-CM

## 2019-01-21 DIAGNOSIS — E86.0 DEHYDRATION: ICD-10-CM

## 2019-01-21 DIAGNOSIS — F41.9 ANXIETY: ICD-10-CM

## 2019-01-21 DIAGNOSIS — R52 BODY ACHES: ICD-10-CM

## 2019-01-21 PROCEDURE — 99214 OFFICE O/P EST MOD 30 MIN: CPT | Performed by: INTERNAL MEDICINE

## 2019-01-21 RX ORDER — SERTRALINE HYDROCHLORIDE 25 MG/1
25 TABLET, FILM COATED ORAL DAILY
Qty: 30 TAB | Refills: 11 | Status: SHIPPED | OUTPATIENT
Start: 2019-01-21 | End: 2019-03-05

## 2019-01-21 ASSESSMENT — PATIENT HEALTH QUESTIONNAIRE - PHQ9
SUM OF ALL RESPONSES TO PHQ QUESTIONS 1-9: 12
CLINICAL INTERPRETATION OF PHQ2 SCORE: 6
5. POOR APPETITE OR OVEREATING: 0 - NOT AT ALL

## 2019-01-21 ASSESSMENT — PAIN SCALES - GENERAL: PAINLEVEL: 8=MODERATE-SEVERE PAIN

## 2019-01-21 NOTE — ED NOTES
Pt discharge home. Pt given discharge instructions  Pt verbalized understanding, all questions answered ,vss upon d/c. Pt's family will be driving pt home

## 2019-01-21 NOTE — ED NOTES
Pt ambulated to hallway, steady, per family at baseline. Pt said that she feels weaker overall but cannot explain.   ERP at bedside discussing test results and poc.

## 2019-01-22 NOTE — PROGRESS NOTES
Established Patient    Kayode presents today with the following:    CC: follow up for ER visit    HPI: 85 year old lady visited ER yesterday for body ache and left axillary pain.These symptoms  Came on after she took Amoxicillin prior to a dental extraction. At ER labs, EKG and chest xray did not show any significant abnormality except for a mild decrease in eGFR and hyaline cast in Urine. Patient stopped taking Amoxicillin and informed her dentist.  Since last night her body ache is resolving and she has no axillary pain, Her daughter is with her today and states that patient has insomnia and depression and axiety which is going on for many years, patient has never been treated for these problems.     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 60 Tab    • Multiple Vitamins-Minerals (MULTIVITAMIN ADULTS 50+) Tab Take  by mouth every day.     • ascorbic acid (ASCORBIC ACID) 500 MG Tab Take 500 mg by mouth every day.     • amoxicillin (AMOXIL) 500 MG Cap TAKE ONE CAPSULE BY MOUTH 3 TIMES A DAY UNTIL GONE  0     No current facility-administered medications for this visit.        ROS: As per HPI. Additional pertinent symptoms as noted below.    Constitutional: no fever no chills  Eyes: no floaters,  no blurred vision  ENT: no sore throat , no earache  Cardiovascular: no chest pain , no palpitaiton  Respiratory: no SOB, no cough  GI: no abdominal pain, no diarrhea, no constipation  : no frequency, no dysuria  Musculo-skeletal: no mylagia, has  "chronic left knee pain.  Psych: depression/anxiety, no SI, no HI. Has insomnia.    /68 (BP Location: Right arm, Patient Position: Sitting, BP Cuff Size: Adult)   Pulse 85   Temp 37.4 °C (99.3 °F) (Temporal)   Ht 1.676 m (5' 6\")   Wt 89.8 kg (198 lb)   SpO2 93%   Breastfeeding? No   BMI 31.96 kg/m²     Physical Exam   Constitutional:  oriented to person, place, and time. No distress.   Eyes: Pupils are equal, round, and reactive to light. No scleral icterus.  Neck: Neck supple. No thyromegaly present.   Cardiovascular: Normal rate, regular rhythm and normal heart sounds.  Exam reveals no gallop and no friction rub.  No murmur heard.  Pulmonary/Chest: Breath sounds normal. Chest wall is not dull to percussion.   Musculoskeletal:   no edema.   Lymphadenopathy: no cervical adenopathy  Neurological: alert and oriented to person, place, and time.   Skin: No cyanosis. Nails show no clubbing.    Note: I have reviewed all pertinent labs and diagnostic tests associated with this visit with specific comments listed under the assessment and plan below    Results for KRISHNA HUERTA (MRN [id number]) as of 1/21/2019 16:12   Ref. Range 1/20/2019 15:01 1/20/2019 15:51   WBC Latest Ref Range: 4.8 - 10.8 K/uL 7.0    RBC Latest Ref Range: 4.20 - 5.40 M/uL 4.16 (L)    Hemoglobin Latest Ref Range: 12.0 - 16.0 g/dL 13.5    Hematocrit Latest Ref Range: 37.0 - 47.0 % 39.6    MCV Latest Ref Range: 81.4 - 97.8 fL 95.2    MCH Latest Ref Range: 27.0 - 33.0 pg 32.5    MCHC Latest Ref Range: 33.6 - 35.0 g/dL 34.1    RDW Latest Ref Range: 35.9 - 50.0 fL 46.8    Platelet Count Latest Ref Range: 164 - 446 K/uL 186    MPV Latest Ref Range: 9.0 - 12.9 fL 8.9 (L)    Neutrophils-Polys Latest Ref Range: 44.00 - 72.00 % 64.20    Neutrophils (Absolute) Latest Ref Range: 2.00 - 7.15 K/uL 4.50    Lymphocytes Latest Ref Range: 22.00 - 41.00 % 25.40    Lymphs (Absolute) Latest Ref Range: 1.00 - 4.80 K/uL 1.78    Monocytes Latest Ref Range: 0.00 - " 13.40 % 8.30    Monos (Absolute) Latest Ref Range: 0.00 - 0.85 K/uL 0.58    Eosinophils Latest Ref Range: 0.00 - 6.90 % 1.40    Eos (Absolute) Latest Ref Range: 0.00 - 0.51 K/uL 0.10    Basophils Latest Ref Range: 0.00 - 1.80 % 0.30    Baso (Absolute) Latest Ref Range: 0.00 - 0.12 K/uL 0.02    Immature Granulocytes Latest Ref Range: 0.00 - 0.90 % 0.40    Immature Granulocytes (abs) Latest Ref Range: 0.00 - 0.11 K/uL 0.03    Nucleated RBC Latest Units: /100 WBC 0.00    NRBC (Absolute) Latest Units: K/uL 0.00    Sodium Latest Ref Range: 135 - 145 mmol/L 139    Potassium Latest Ref Range: 3.6 - 5.5 mmol/L 4.1    Chloride Latest Ref Range: 96 - 112 mmol/L 107    Co2 Latest Ref Range: 20 - 33 mmol/L 25    Anion Gap Latest Ref Range: 0.0 - 11.9  7.0    Glucose Latest Ref Range: 65 - 99 mg/dL 99    Bun Latest Ref Range: 8 - 22 mg/dL 18    Creatinine Latest Ref Range: 0.50 - 1.40 mg/dL 0.98    GFR If  Latest Ref Range: >60 mL/min/1.73 m 2 >60    GFR If Non  Latest Ref Range: >60 mL/min/1.73 m 2 54 (A)    Calcium Latest Ref Range: 8.5 - 10.5 mg/dL 9.6    AST(SGOT) Latest Ref Range: 12 - 45 U/L 13    ALT(SGPT) Latest Ref Range: 2 - 50 U/L 10    Alkaline Phosphatase Latest Ref Range: 30 - 99 U/L 46    Total Bilirubin Latest Ref Range: 0.1 - 1.5 mg/dL 0.6    Albumin Latest Ref Range: 3.2 - 4.9 g/dL 3.9    Total Protein Latest Ref Range: 6.0 - 8.2 g/dL 6.6    Globulin Latest Ref Range: 1.9 - 3.5 g/dL 2.7    A-G Ratio Latest Units: g/dL 1.4    Lipase Latest Ref Range: 11 - 82 U/L 19    Troponin I Latest Ref Range: 0.00 - 0.04 ng/mL <0.01    B Natriuretic Peptide Latest Ref Range: 0 - 100 pg/mL 31    Urobilinogen, Urine Latest Ref Range: Negative   0.2   PT Latest Ref Range: 12.0 - 14.6 sec 14.7 (H)    INR Latest Ref Range: 0.87 - 1.13  1.13    APTT Latest Ref Range: 24.7 - 36.0 sec 32.3    Color Unknown  Yellow   Character Unknown  Clear   Specific Gravity Latest Ref Range: <1.035   1.018   Ph  Latest Ref Range: 5.0 - 8.0   5.0   Glucose Latest Ref Range: Negative mg/dL  Negative   Ketones Latest Ref Range: Negative mg/dL  Negative   Bilirubin Latest Ref Range: Negative   Negative   Occult Blood Latest Ref Range: Negative   Trace (A)   Protein Latest Ref Range: Negative mg/dL  Negative   Nitrite Latest Ref Range: Negative   Negative   Leukocyte Esterase Latest Ref Range: Negative   Negative   Micro Urine Req Unknown  Microscopic   WBC Latest Units: /hpf  0-2   RBC Latest Units: /hpf  0-2   Epithelial Cells Latest Units: /hpf  Negative   Bacteria Latest Ref Range: None /hpf  Negative   Hyaline Cast Latest Units: /lpf  3-5 (A)       Assessment and Plan    1. Body aches  2. Dehydration  Started yesterday after she took Amoxicillin, she went to ER, cxray, cbc, chem panel BNP, troponin and they only showed hyaline cast in urine and decreased eGFR.  She was discharged and advised to follow up with PCP, she is feeling well now but had insomnia last night.      3. Dental problem/ was scheduled to have tooth extraction  thye have contacted their dentist and per her dentist OK to stop Amoxicillin.     4- Depression/ Anxeity  MAMADOU score is 11, PHQ 9 is 12. QT was normal on last EKG yesterday.  No SI. No HI  Started on Zoloft 25 mg daily. Follow up in 4 weeks.      5- Insomnia,   She wakes up 1-2 times a night and mainly to go to bathroom, she falls sleep in 10 minutes, she sometimes start working with her computer.  Educated her about sleep hygiene. Advised her not to drink 2 hours before going to bed otherwise drink plenty of fluid during the day. If she still has problem to use melatonin over the counter. She drinks 2-3 cups of coffee in the afternoon. I  advised her to decrease her coffee and only take 1 cup in the morning.      Followup: in 4 weeks.      Signed by: Fiordaliza Wood M.D.

## 2019-03-05 ENCOUNTER — OFFICE VISIT (OUTPATIENT)
Dept: INTERNAL MEDICINE | Facility: MEDICAL CENTER | Age: 84
End: 2019-03-05
Payer: MEDICARE

## 2019-03-05 VITALS
HEIGHT: 66 IN | BODY MASS INDEX: 32.98 KG/M2 | HEART RATE: 79 BPM | OXYGEN SATURATION: 94 % | WEIGHT: 205.2 LBS | SYSTOLIC BLOOD PRESSURE: 126 MMHG | TEMPERATURE: 97.5 F | DIASTOLIC BLOOD PRESSURE: 66 MMHG

## 2019-03-05 DIAGNOSIS — F32.A DEPRESSION, UNSPECIFIED DEPRESSION TYPE: ICD-10-CM

## 2019-03-05 DIAGNOSIS — F41.9 ANXIETY: ICD-10-CM

## 2019-03-05 PROCEDURE — 99213 OFFICE O/P EST LOW 20 MIN: CPT | Mod: GE | Performed by: INTERNAL MEDICINE

## 2019-03-05 ASSESSMENT — ENCOUNTER SYMPTOMS
SPEECH CHANGE: 0
HALLUCINATIONS: 0
TREMORS: 0
PALPITATIONS: 0
FOCAL WEAKNESS: 0
HEADACHES: 0
COUGH: 0
TINGLING: 0
CHILLS: 0
MYALGIAS: 0
DIZZINESS: 0
SHORTNESS OF BREATH: 0
SENSORY CHANGE: 0
DIARRHEA: 0
DEPRESSION: 1
NERVOUS/ANXIOUS: 1
ABDOMINAL PAIN: 0
NAUSEA: 0
CONSTIPATION: 0
BLURRED VISION: 0
DOUBLE VISION: 0
FEVER: 0
VOMITING: 0

## 2019-03-05 ASSESSMENT — PATIENT HEALTH QUESTIONNAIRE - PHQ9
CLINICAL INTERPRETATION OF PHQ2 SCORE: 3
SUM OF ALL RESPONSES TO PHQ QUESTIONS 1-9: 11
5. POOR APPETITE OR OVEREATING: 2 - MORE THAN HALF THE DAYS

## 2019-03-06 NOTE — PROGRESS NOTES
Established Patient    Kayode presents today with the following:    CC: Follow-up for anxiety and depression    HPI: This is a 85-year-old female who presents for follow-up of her anxiety and depression.  She was started on Zoloft 25 mg daily on 01/21/2019.  She is accompanied by her daughter who provides most of the history.  Her daughter is very concerned, as she thinks that her mother suffers from excessive worry and excessive anxiety, which often causes her to have insomnia.  She says that the Zoloft has been helping her and has improved her pain symptoms, but she continues to have excessive anxiety.  She also mentions that her mother has been sleeping a lot.  Patient is quiet and agrees that she does often have excessive worry.  She says that she has been through a lot in her life and that her daughter sometimes does not understand.  She denies any suicidal or homicidal ideation.  Denies visual or auditory hallucinations.  Both mother and patient deny any history of anxiety attacks.  She saw a therapist in the past, but as per the daughter it did not help.  The daughter thinks that her mother may benefit from an increase in her dose of Zoloft.  Patient denies any side effects from the medication, including dizziness, nausea, vomiting, constipation or diarrhea.  Patient lives alone but says that her daughter lives in the house next door.    Patient Active Problem List    Diagnosis Date Noted   • Body aches 01/21/2019   • Dehydration 01/21/2019   • Thrombocytopenia (HCC) 03/20/2018   • Patient is Mu-ism 02/20/2018   • Vitamin D deficiency 02/20/2018   • Depression 02/15/2018   • Elevated blood pressure reading 02/15/2018   • Low back pain 10/25/2016   • Hip pain 01/22/2014   • Obesity 01/22/2014       Current Outpatient Prescriptions   Medication Sig Dispense Refill   • sertraline (ZOLOFT) 50 MG Tab Take 1 Tab by mouth every day. 60 Tab 0   • vitamin D (CHOLECALCIFEROL) 1000 UNIT Tab Take 1 Tab by mouth  every day. 60 Tab    • Multiple Vitamins-Minerals (MULTIVITAMIN ADULTS 50+) Tab Take  by mouth every day.     • ascorbic acid (ASCORBIC ACID) 500 MG Tab Take 500 mg by mouth every day.     • amoxicillin (AMOXIL) 500 MG Cap TAKE ONE CAPSULE BY MOUTH 3 TIMES A DAY UNTIL GONE  0     No current facility-administered medications for this visit.        Social History     Social History   • Marital status:      Spouse name: N/A   • Number of children: N/A   • Years of education: 2 year college     Occupational History   • Medical Technician      Social History Main Topics   • Smoking status: Never Smoker   • Smokeless tobacco: Never Used   • Alcohol use 0.0 oz/week      Comment: rare.    • Drug use: Unknown   • Sexual activity: Not on file     Other Topics Concern   • Not on file     Social History Narrative    Tenriism    Walks twice a week for one hour at a time.     Lives alone, but family lives on the same block.        Family History   Problem Relation Age of Onset   • Depression Sister    • Genetic Neg Hx        ROS: As per HPI. Additional pertinent systems as noted below.    Review of Systems   Constitutional: Negative for chills and fever.   Eyes: Negative for blurred vision and double vision.   Respiratory: Negative for cough and shortness of breath.    Cardiovascular: Negative for chest pain and palpitations.   Gastrointestinal: Negative for abdominal pain, constipation, diarrhea, nausea and vomiting.   Genitourinary: Negative for dysuria and urgency.   Musculoskeletal: Negative for joint pain and myalgias.   Skin: Negative for itching and rash.   Neurological: Negative for dizziness, tingling, tremors, sensory change, speech change, focal weakness and headaches.   Psychiatric/Behavioral: Positive for depression. Negative for hallucinations and suicidal ideas. The patient is nervous/anxious.    All other systems reviewed and are negative.      /66 (BP Location: Right arm, Patient Position:  "Sitting)   Pulse 79   Temp 36.4 °C (97.5 °F) (Temporal)   Ht 1.676 m (5' 6\")   Wt 93.1 kg (205 lb 3.2 oz)   SpO2 94%   BMI 33.12 kg/m²     Physical Exam  Physical Exam   Constitutional: She is oriented to person, place, and time. No distress.   HENT:   Head: Normocephalic and atraumatic.   Mouth/Throat: Oropharynx is clear and moist.   Eyes: Pupils are equal, round, and reactive to light. Conjunctivae and EOM are normal.   Neck: No tracheal deviation present.   Cardiovascular: Normal rate, regular rhythm and normal heart sounds.  Exam reveals no gallop and no friction rub.    No murmur heard.  Pulmonary/Chest: Breath sounds normal. No respiratory distress. She has no wheezes.   Abdominal: Soft. She exhibits no distension. There is no tenderness.   Musculoskeletal: Normal range of motion. She exhibits no edema.   Lymphadenopathy:     She has no cervical adenopathy.   Neurological: She is alert and oriented to person, place, and time.   Skin: Skin is warm and dry. She is not diaphoretic.   Psychiatric: Memory, affect and judgment normal. Her mood appears anxious. She exhibits a depressed mood. She expresses no homicidal and no suicidal ideation. She expresses no suicidal plans and no homicidal plans.       Assessment and Plan    1. Depression, unspecified depression type  2. Anxiety  -On 1/21/2019, Angel 7 score was 11 and PHQ 9 was 12  -Today Angel 7 score is 9 and PHQ 9 score is 11  -Patient has been tolerating the Zoloft well, but continues to have excessive worry and anxiety  -We will increase the Zoloft to 50 mg daily and follow-up in 4-6 weeks  -Refer to psychology    - sertraline (ZOLOFT) 50 MG Tab; Take 1 Tab by mouth every day.  Dispense: 60 Tab; Refill: 0  - REFERRAL TO PSYCHOLOGY    Follow-up with PCP    This dictation has been created using a voice recognition software.  The accuracy of the dictation is limited to the abilities of the software.  I expect there may be some errors of grammar and possibly " content.  I have made every attempt to manually correct the errors within my dictation.  However errors related to this voice recognition software may still exist and should be interpreted within the appropriate context.      Signed by: Kike Bernstein M.D.

## 2019-05-01 DIAGNOSIS — F32.A DEPRESSION, UNSPECIFIED DEPRESSION TYPE: ICD-10-CM

## 2019-05-01 DIAGNOSIS — F41.9 ANXIETY: ICD-10-CM

## 2019-05-01 NOTE — TELEPHONE ENCOUNTER
Last seen: 03/05/19 by Dr. Bernstein  Next appt: None     Was the patient seen in the last year in this department? Yes   Does patient have an active prescription for medications requested? No   Received Request Via: Pharmacy

## 2019-05-02 NOTE — TELEPHONE ENCOUNTER
Attempted to call pt, a female answered and hung up the phone after introducing myself. Will attempt to call again tomorrow.

## 2019-05-07 NOTE — TELEPHONE ENCOUNTER
Pt continues to hang up on me when I call and identify myself, I have left a message with her daughter.

## 2019-05-07 NOTE — TELEPHONE ENCOUNTER
Kameron Benson M.D.   [facility] 6 days ago      Patient with increase in sertraline over 4 weeks ago.   Please call patient to see how she is tolerating increase.in sertraline Ifthere are concerns or continued issues with anxiety/depression, please have patient schedule f/u appointment. (Routing comment)

## 2020-03-02 ENCOUNTER — HOSPITAL ENCOUNTER (OUTPATIENT)
Dept: LAB | Facility: MEDICAL CENTER | Age: 85
End: 2020-03-02
Attending: FAMILY MEDICINE
Payer: MEDICARE

## 2020-03-02 LAB
ALBUMIN SERPL BCP-MCNC: 4.2 G/DL (ref 3.2–4.9)
ALBUMIN/GLOB SERPL: 1.5 G/DL
ALP SERPL-CCNC: 49 U/L (ref 30–99)
ALT SERPL-CCNC: 18 U/L (ref 2–50)
ANION GAP SERPL CALC-SCNC: 6 MMOL/L (ref 0–11.9)
AST SERPL-CCNC: 17 U/L (ref 12–45)
BASOPHILS # BLD AUTO: 0.6 % (ref 0–1.8)
BASOPHILS # BLD: 0.04 K/UL (ref 0–0.12)
BILIRUB SERPL-MCNC: 0.6 MG/DL (ref 0.1–1.5)
BUN SERPL-MCNC: 21 MG/DL (ref 8–22)
CALCIUM SERPL-MCNC: 9.5 MG/DL (ref 8.5–10.5)
CHLORIDE SERPL-SCNC: 109 MMOL/L (ref 96–112)
CHOLEST SERPL-MCNC: 228 MG/DL (ref 100–199)
CO2 SERPL-SCNC: 27 MMOL/L (ref 20–33)
CREAT SERPL-MCNC: 1.02 MG/DL (ref 0.5–1.4)
EOSINOPHIL # BLD AUTO: 0.09 K/UL (ref 0–0.51)
EOSINOPHIL NFR BLD: 1.4 % (ref 0–6.9)
ERYTHROCYTE [DISTWIDTH] IN BLOOD BY AUTOMATED COUNT: 47.2 FL (ref 35.9–50)
FASTING STATUS PATIENT QL REPORTED: NORMAL
GLOBULIN SER CALC-MCNC: 2.8 G/DL (ref 1.9–3.5)
GLUCOSE SERPL-MCNC: 94 MG/DL (ref 65–99)
HCT VFR BLD AUTO: 43.6 % (ref 37–47)
HDLC SERPL-MCNC: 32 MG/DL
HGB BLD-MCNC: 14.6 G/DL (ref 12–16)
IMM GRANULOCYTES # BLD AUTO: 0.02 K/UL (ref 0–0.11)
IMM GRANULOCYTES NFR BLD AUTO: 0.3 % (ref 0–0.9)
LDLC SERPL CALC-MCNC: 153 MG/DL
LYMPHOCYTES # BLD AUTO: 2.71 K/UL (ref 1–4.8)
LYMPHOCYTES NFR BLD: 40.8 % (ref 22–41)
MCH RBC QN AUTO: 32.2 PG (ref 27–33)
MCHC RBC AUTO-ENTMCNC: 33.5 G/DL (ref 33.6–35)
MCV RBC AUTO: 96.2 FL (ref 81.4–97.8)
MONOCYTES # BLD AUTO: 0.5 K/UL (ref 0–0.85)
MONOCYTES NFR BLD AUTO: 7.5 % (ref 0–13.4)
NEUTROPHILS # BLD AUTO: 3.29 K/UL (ref 2–7.15)
NEUTROPHILS NFR BLD: 49.4 % (ref 44–72)
NRBC # BLD AUTO: 0 K/UL
NRBC BLD-RTO: 0 /100 WBC
PLATELET # BLD AUTO: 211 K/UL (ref 164–446)
PMV BLD AUTO: 9.1 FL (ref 9–12.9)
POTASSIUM SERPL-SCNC: 4.5 MMOL/L (ref 3.6–5.5)
PROT SERPL-MCNC: 7 G/DL (ref 6–8.2)
RBC # BLD AUTO: 4.53 M/UL (ref 4.2–5.4)
SODIUM SERPL-SCNC: 142 MMOL/L (ref 135–145)
TRIGL SERPL-MCNC: 214 MG/DL (ref 0–149)
WBC # BLD AUTO: 6.7 K/UL (ref 4.8–10.8)

## 2020-03-02 PROCEDURE — 36415 COLL VENOUS BLD VENIPUNCTURE: CPT

## 2020-03-02 PROCEDURE — 80061 LIPID PANEL: CPT

## 2020-03-02 PROCEDURE — 85025 COMPLETE CBC W/AUTO DIFF WBC: CPT

## 2020-03-02 PROCEDURE — 80053 COMPREHEN METABOLIC PANEL: CPT

## 2021-01-11 DIAGNOSIS — Z23 NEED FOR VACCINATION: ICD-10-CM

## 2021-01-17 ENCOUNTER — IMMUNIZATION (OUTPATIENT)
Dept: FAMILY PLANNING/WOMEN'S HEALTH CLINIC | Facility: IMMUNIZATION CENTER | Age: 86
End: 2021-01-17
Attending: INTERNAL MEDICINE
Payer: MEDICARE

## 2021-01-17 DIAGNOSIS — Z23 NEED FOR VACCINATION: ICD-10-CM

## 2021-01-17 DIAGNOSIS — Z23 ENCOUNTER FOR VACCINATION: Primary | ICD-10-CM

## 2021-01-17 PROCEDURE — 91300 PFIZER SARS-COV-2 VACCINE: CPT

## 2021-01-17 PROCEDURE — 0001A PFIZER SARS-COV-2 VACCINE: CPT

## 2021-02-05 ENCOUNTER — IMMUNIZATION (OUTPATIENT)
Dept: FAMILY PLANNING/WOMEN'S HEALTH CLINIC | Facility: IMMUNIZATION CENTER | Age: 86
End: 2021-02-05
Attending: INTERNAL MEDICINE
Payer: MEDICARE

## 2021-02-05 DIAGNOSIS — Z23 ENCOUNTER FOR VACCINATION: Primary | ICD-10-CM

## 2021-02-05 PROCEDURE — 91300 PFIZER SARS-COV-2 VACCINE: CPT

## 2021-02-05 PROCEDURE — 0002A PFIZER SARS-COV-2 VACCINE: CPT

## 2021-05-18 ENCOUNTER — PATIENT MESSAGE (OUTPATIENT)
Dept: HEALTH INFORMATION MANAGEMENT | Facility: OTHER | Age: 86
End: 2021-05-18

## 2021-06-07 ENCOUNTER — PATIENT OUTREACH (OUTPATIENT)
Dept: HEALTH INFORMATION MANAGEMENT | Facility: OTHER | Age: 86
End: 2021-06-07

## 2021-06-07 NOTE — PROGRESS NOTES
Outcome: Left Message to schedule Comprehensive Health Assessment.    Please transfer to Patient Outreach Team at [phone] when patient returns call.      Attempt # 2

## 2021-07-20 ENCOUNTER — OFFICE VISIT (OUTPATIENT)
Dept: MEDICAL GROUP | Facility: PHYSICIAN GROUP | Age: 86
End: 2021-07-20
Payer: MEDICARE

## 2021-07-20 VITALS
WEIGHT: 212.2 LBS | HEART RATE: 72 BPM | TEMPERATURE: 97.5 F | SYSTOLIC BLOOD PRESSURE: 124 MMHG | HEIGHT: 64 IN | RESPIRATION RATE: 20 BRPM | DIASTOLIC BLOOD PRESSURE: 70 MMHG | OXYGEN SATURATION: 94 % | BODY MASS INDEX: 36.23 KG/M2

## 2021-07-20 DIAGNOSIS — Z78.0 POSTMENOPAUSAL ESTROGEN DEFICIENCY: ICD-10-CM

## 2021-07-20 DIAGNOSIS — R79.89 ELEVATED TSH: ICD-10-CM

## 2021-07-20 DIAGNOSIS — E55.9 VITAMIN D DEFICIENCY: ICD-10-CM

## 2021-07-20 DIAGNOSIS — Z76.89 ENCOUNTER TO ESTABLISH CARE: ICD-10-CM

## 2021-07-20 DIAGNOSIS — Z13.6 SCREENING FOR CARDIOVASCULAR CONDITION: ICD-10-CM

## 2021-07-20 DIAGNOSIS — N18.31 STAGE 3A CHRONIC KIDNEY DISEASE: ICD-10-CM

## 2021-07-20 DIAGNOSIS — E78.2 MIXED HYPERLIPIDEMIA: ICD-10-CM

## 2021-07-20 DIAGNOSIS — Z12.31 ENCOUNTER FOR SCREENING MAMMOGRAM FOR MALIGNANT NEOPLASM OF BREAST: ICD-10-CM

## 2021-07-20 PROBLEM — F32.A DEPRESSION: Status: RESOLVED | Noted: 2018-02-15 | Resolved: 2021-07-20

## 2021-07-20 PROBLEM — R52 GENERALIZED PAIN: Status: RESOLVED | Noted: 2019-01-21 | Resolved: 2021-07-20

## 2021-07-20 PROBLEM — R03.0 ELEVATED BLOOD PRESSURE READING: Status: RESOLVED | Noted: 2018-02-15 | Resolved: 2021-07-20

## 2021-07-20 PROBLEM — R41.3 MEMORY IMPAIRMENT: Status: ACTIVE | Noted: 2017-12-05

## 2021-07-20 PROBLEM — E86.0 DEHYDRATION: Status: RESOLVED | Noted: 2019-01-21 | Resolved: 2021-07-20

## 2021-07-20 PROBLEM — D69.6 THROMBOCYTOPENIA (HCC): Status: RESOLVED | Noted: 2018-03-20 | Resolved: 2021-07-20

## 2021-07-20 PROCEDURE — 99203 OFFICE O/P NEW LOW 30 MIN: CPT | Performed by: FAMILY MEDICINE

## 2021-07-20 ASSESSMENT — PATIENT HEALTH QUESTIONNAIRE - PHQ9
SUM OF ALL RESPONSES TO PHQ9 QUESTIONS 1 AND 2: 0
4. FEELING TIRED OR HAVING LITTLE ENERGY: NOT AT ALL
5. POOR APPETITE OR OVEREATING: NOT AT ALL
CLINICAL INTERPRETATION OF PHQ2 SCORE: 0
3. TROUBLE FALLING OR STAYING ASLEEP OR SLEEPING TOO MUCH: NOT AT ALL
1. LITTLE INTEREST OR PLEASURE IN DOING THINGS: NOT AT ALL
9. THOUGHTS THAT YOU WOULD BE BETTER OFF DEAD, OR OF HURTING YOURSELF: NOT AT ALL
2. FEELING DOWN, DEPRESSED, IRRITABLE, OR HOPELESS: NOT AT ALL
6. FEELING BAD ABOUT YOURSELF - OR THAT YOU ARE A FAILURE OR HAVE LET YOURSELF OR YOUR FAMILY DOWN: NOT AL ALL
SUM OF ALL RESPONSES TO PHQ QUESTIONS 1-9: 0
8. MOVING OR SPEAKING SO SLOWLY THAT OTHER PEOPLE COULD HAVE NOTICED. OR THE OPPOSITE, BEING SO FIGETY OR RESTLESS THAT YOU HAVE BEEN MOVING AROUND A LOT MORE THAN USUAL: NOT AT ALL
7. TROUBLE CONCENTRATING ON THINGS, SUCH AS READING THE NEWSPAPER OR WATCHING TELEVISION: NOT AT ALL

## 2021-07-20 ASSESSMENT — FIBROSIS 4 INDEX: FIB4 SCORE: 1.67

## 2021-07-20 NOTE — ASSESSMENT & PLAN NOTE
This is a chronic condition noted on previous labs done in 2017.  It does not appear that it was ever followed up on. lab will be ordered.

## 2021-07-20 NOTE — ASSESSMENT & PLAN NOTE
This is a chronic problem.  Patient is not currently on vitamin D supplementation all the labs in the past which showed a mild deficiency.

## 2021-07-20 NOTE — ASSESSMENT & PLAN NOTE
This is a chronic condition.  Patient has hyperlipidemia that is mixed.  She is not on any current medications.  She eats a fairly healthy diet.

## 2021-07-20 NOTE — PROGRESS NOTES
Subjective:     CC: Here to establish care.    HPI:   Kayode presents today with the following medical concerns:    Encounter to establish care  Patient is here today to establish care.  She has no really concerns on today's visit.  She is due for labs.  Not currently on any medications.  Patient is due for her DEXA scan and mammogram.  Those will be ordered today.    Elevated TSH  This is a chronic condition noted on previous labs done in 2017.  It does not appear that it was ever followed up on. lab will be ordered.    Screening for cardiovascular condition  This is a chronic condition.  Patient has a history of hyperlipidemia.  Labs ordered.    Mixed hyperlipidemia  This is a chronic condition.  Patient has hyperlipidemia that is mixed.  She is not on any current medications.  She eats a fairly healthy diet.    Stage 3a chronic kidney disease (HCC)  This is a chronic condition.  Patient has mild decrease in her GFR with normal creatinine.  She was told to avoid NSAIDs in the future.    Vitamin D deficiency  This is a chronic problem.  Patient is not currently on vitamin D supplementation all the labs in the past which showed a mild deficiency.      Past Medical History:   Diagnosis Date   • Hip pain 1/22/2014   • Mild depression (HCC)        Social History     Tobacco Use   • Smoking status: Never Smoker   • Smokeless tobacco: Never Used   Vaping Use   • Vaping Use: Never used   Substance Use Topics   • Alcohol use: Yes     Alcohol/week: 0.0 oz     Comment: rare.    • Drug use: Never       No current [facility]-ordered outpatient medications on file.     No current [facility]-ordered facility-administered medications on file.       Allergies:  Amoxicillin, Novocain, Other food, Bloodless, Peanut-derived, and Red dye #40 [fd&c red #40]    Health Maintenance: Completed    ROS:  Gen: no fevers/chills, no changes in weight  Eyes: no changes in vision  ENT: no sore throat, no hearing loss, no bloody nose  Pulm: no sob, no  "cough  CV: no chest pain, no palpitations  GI: no nausea/vomiting, no diarrhea  : no dysuria  MSk: no myalgias  Skin: no rash  Neuro: no headaches, no numbness/tingling  Heme/Lymph: no easy bruising      Objective:       Exam:  /70 (BP Location: Right arm, Patient Position: Sitting, BP Cuff Size: Large adult)   Pulse 72   Temp 36.4 °C (97.5 °F) (Temporal)   Resp 20   Ht 1.613 m (5' 3.5\")   Wt 96.3 kg (212 lb 3.2 oz)   SpO2 94%   BMI 37.00 kg/m²  Body mass index is 37 kg/m².    Gen: Alert and oriented, No apparent distress.  Neck: Neck is supple without lymphadenopathy.  Thyroid exam is normal.  Lungs: Normal effort, CTA bilaterally, no wheezes, rhonchi, or rales  CV: Regular rate and rhythm. No murmurs, rubs, or gallops.  Abdomen: Soft, nontender, no organomegaly or masses.  Normal bowel sounds.  Ext: No clubbing, cyanosis, edema.  Mild crepitus on range of motion of the knees.  Neuro: Cranial nerves II through VIII are grossly intact.  No lateralizing signs are seen.  Gait is normal.  Psych: Patient is alert and oriented x3.  No unusual thought process expressed.  She does not appear to be overtly depressed or anxious today.        Labs: Previous labs reviewed.    Assessment & Plan:     88 y.o. female with the following -     1. Stage 3a chronic kidney disease (HCC)  This is a chronic problem.  Lab ordered.  Patient to avoid NSAIDs.  - Comp Metabolic Panel; Future  - URINALYSIS,CULTURE IF INDICATED; Future  - CBC WITH DIFFERENTIAL; Future  - ESTIMATED GFR; Future    2. Mixed hyperlipidemia  This is a chronic problem.  Lab ordered.  - Lipid Profile; Future    3. Encounter for screening mammogram for malignant neoplasm of breast  This is a screening issue.  Mammogram ordered.  - MA-SCREENING MAMMO BILAT W/TOMOSYNTHESIS W/CAD; Future    4. Postmenopausal estrogen deficiency  This is a chronic condition.  DEXA scan ordered.  - CBC WITH DIFFERENTIAL; Future  - DS-BONE DENSITY STUDY (DEXA)    5. Screening " for cardiovascular condition  This is a screening issue.  Lab ordered.  - Comp Metabolic Panel; Future  - CBC WITH DIFFERENTIAL; Future    6. Vitamin D deficiency  This is a chronic condition.  Lab ordered.  - VITAMIN D,25 HYDROXY; Future    7. Elevated TSH  This is a chronic condition.  Lab ordered.  - TSH WITH REFLEX TO FT4; Future    8. Encounter to establish care  Patient establish care with me today.  We will see her before the end of the year for wellness exam otherwise in 1 year for physical exam.      Return in about 3 months (around 10/20/2021) for Long, wellness.  34 minutes spent with the patient and reviewing her chart.  Please note that this dictation was created using voice recognition software. I have made every reasonable attempt to correct obvious errors, but I expect that there are errors of grammar and possibly content that I did not discover before finalizing the note.

## 2021-07-20 NOTE — ASSESSMENT & PLAN NOTE
Patient is here today to establish care.  She has no really concerns on today's visit.  She is due for labs.  Not currently on any medications.  Patient is due for her DEXA scan and mammogram.  Those will be ordered today.

## 2021-07-20 NOTE — ASSESSMENT & PLAN NOTE
This is a chronic condition.  Patient has mild decrease in her GFR with normal creatinine.  She was told to avoid NSAIDs in the future.

## 2021-07-21 ENCOUNTER — HOSPITAL ENCOUNTER (OUTPATIENT)
Dept: LAB | Facility: MEDICAL CENTER | Age: 86
End: 2021-07-21
Attending: FAMILY MEDICINE
Payer: MEDICARE

## 2021-07-21 DIAGNOSIS — E78.2 MIXED HYPERLIPIDEMIA: ICD-10-CM

## 2021-07-21 DIAGNOSIS — N18.31 STAGE 3A CHRONIC KIDNEY DISEASE: ICD-10-CM

## 2021-07-21 DIAGNOSIS — Z78.0 POSTMENOPAUSAL ESTROGEN DEFICIENCY: ICD-10-CM

## 2021-07-21 DIAGNOSIS — E55.9 VITAMIN D DEFICIENCY: ICD-10-CM

## 2021-07-21 DIAGNOSIS — Z13.6 SCREENING FOR CARDIOVASCULAR CONDITION: ICD-10-CM

## 2021-07-21 DIAGNOSIS — R79.89 ELEVATED TSH: ICD-10-CM

## 2021-07-21 LAB
25(OH)D3 SERPL-MCNC: 31 NG/ML (ref 30–100)
ALBUMIN SERPL BCP-MCNC: 4 G/DL (ref 3.2–4.9)
ALBUMIN/GLOB SERPL: 1.4 G/DL
ALP SERPL-CCNC: 54 U/L (ref 30–99)
ALT SERPL-CCNC: 18 U/L (ref 2–50)
ANION GAP SERPL CALC-SCNC: 12 MMOL/L (ref 7–16)
APPEARANCE UR: CLEAR
AST SERPL-CCNC: 22 U/L (ref 12–45)
BASOPHILS # BLD AUTO: 0.6 % (ref 0–1.8)
BASOPHILS # BLD: 0.04 K/UL (ref 0–0.12)
BILIRUB SERPL-MCNC: 0.5 MG/DL (ref 0.1–1.5)
BILIRUB UR QL STRIP.AUTO: NEGATIVE
BUN SERPL-MCNC: 18 MG/DL (ref 8–22)
CALCIUM SERPL-MCNC: 9 MG/DL (ref 8.5–10.5)
CHLORIDE SERPL-SCNC: 106 MMOL/L (ref 96–112)
CHOLEST SERPL-MCNC: 217 MG/DL (ref 100–199)
CO2 SERPL-SCNC: 22 MMOL/L (ref 20–33)
COLOR UR: YELLOW
CREAT SERPL-MCNC: 0.85 MG/DL (ref 0.5–1.4)
EOSINOPHIL # BLD AUTO: 0.08 K/UL (ref 0–0.51)
EOSINOPHIL NFR BLD: 1.1 % (ref 0–6.9)
ERYTHROCYTE [DISTWIDTH] IN BLOOD BY AUTOMATED COUNT: 48.9 FL (ref 35.9–50)
FASTING STATUS PATIENT QL REPORTED: NORMAL
GLOBULIN SER CALC-MCNC: 2.8 G/DL (ref 1.9–3.5)
GLUCOSE SERPL-MCNC: 98 MG/DL (ref 65–99)
GLUCOSE UR STRIP.AUTO-MCNC: NEGATIVE MG/DL
HCT VFR BLD AUTO: 43.6 % (ref 37–47)
HDLC SERPL-MCNC: 34 MG/DL
HGB BLD-MCNC: 14.4 G/DL (ref 12–16)
IMM GRANULOCYTES # BLD AUTO: 0.04 K/UL (ref 0–0.11)
IMM GRANULOCYTES NFR BLD AUTO: 0.6 % (ref 0–0.9)
KETONES UR STRIP.AUTO-MCNC: NEGATIVE MG/DL
LDLC SERPL CALC-MCNC: 144 MG/DL
LEUKOCYTE ESTERASE UR QL STRIP.AUTO: NEGATIVE
LYMPHOCYTES # BLD AUTO: 2.78 K/UL (ref 1–4.8)
LYMPHOCYTES NFR BLD: 39.3 % (ref 22–41)
MCH RBC QN AUTO: 32.6 PG (ref 27–33)
MCHC RBC AUTO-ENTMCNC: 33 G/DL (ref 33.6–35)
MCV RBC AUTO: 98.6 FL (ref 81.4–97.8)
MICRO URNS: NORMAL
MONOCYTES # BLD AUTO: 0.59 K/UL (ref 0–0.85)
MONOCYTES NFR BLD AUTO: 8.3 % (ref 0–13.4)
NEUTROPHILS # BLD AUTO: 3.55 K/UL (ref 2–7.15)
NEUTROPHILS NFR BLD: 50.1 % (ref 44–72)
NITRITE UR QL STRIP.AUTO: NEGATIVE
NRBC # BLD AUTO: 0 K/UL
NRBC BLD-RTO: 0 /100 WBC
PH UR STRIP.AUTO: 5.5 [PH] (ref 5–8)
PLATELET # BLD AUTO: 221 K/UL (ref 164–446)
PMV BLD AUTO: 9.7 FL (ref 9–12.9)
POTASSIUM SERPL-SCNC: 4.4 MMOL/L (ref 3.6–5.5)
PROT SERPL-MCNC: 6.8 G/DL (ref 6–8.2)
PROT UR QL STRIP: NEGATIVE MG/DL
RBC # BLD AUTO: 4.42 M/UL (ref 4.2–5.4)
RBC UR QL AUTO: NEGATIVE
SODIUM SERPL-SCNC: 140 MMOL/L (ref 135–145)
SP GR UR STRIP.AUTO: 1.02
T4 FREE SERPL-MCNC: 1.02 NG/DL (ref 0.93–1.7)
TRIGL SERPL-MCNC: 197 MG/DL (ref 0–149)
TSH SERPL DL<=0.005 MIU/L-ACNC: 5.46 UIU/ML (ref 0.38–5.33)
UROBILINOGEN UR STRIP.AUTO-MCNC: 0.2 MG/DL
WBC # BLD AUTO: 7.1 K/UL (ref 4.8–10.8)

## 2021-07-21 PROCEDURE — 36415 COLL VENOUS BLD VENIPUNCTURE: CPT

## 2021-07-21 PROCEDURE — 85025 COMPLETE CBC W/AUTO DIFF WBC: CPT

## 2021-07-21 PROCEDURE — 80061 LIPID PANEL: CPT

## 2021-07-21 PROCEDURE — 82306 VITAMIN D 25 HYDROXY: CPT

## 2021-07-21 PROCEDURE — 84443 ASSAY THYROID STIM HORMONE: CPT

## 2021-07-21 PROCEDURE — 84439 ASSAY OF FREE THYROXINE: CPT

## 2021-07-21 PROCEDURE — 80053 COMPREHEN METABOLIC PANEL: CPT

## 2021-07-21 PROCEDURE — 81003 URINALYSIS AUTO W/O SCOPE: CPT

## 2021-09-01 ENCOUNTER — HOSPITAL ENCOUNTER (OUTPATIENT)
Dept: RADIOLOGY | Facility: MEDICAL CENTER | Age: 86
End: 2021-09-01
Attending: FAMILY MEDICINE
Payer: MEDICARE

## 2021-09-01 DIAGNOSIS — Z12.31 ENCOUNTER FOR SCREENING MAMMOGRAM FOR MALIGNANT NEOPLASM OF BREAST: ICD-10-CM

## 2021-09-01 PROCEDURE — 77063 BREAST TOMOSYNTHESIS BI: CPT

## 2021-09-01 PROCEDURE — 77080 DXA BONE DENSITY AXIAL: CPT

## 2021-10-20 ENCOUNTER — OFFICE VISIT (OUTPATIENT)
Dept: MEDICAL GROUP | Facility: PHYSICIAN GROUP | Age: 86
End: 2021-10-20
Payer: MEDICARE

## 2021-10-20 VITALS
HEART RATE: 67 BPM | RESPIRATION RATE: 14 BRPM | HEIGHT: 64 IN | DIASTOLIC BLOOD PRESSURE: 62 MMHG | WEIGHT: 210.4 LBS | SYSTOLIC BLOOD PRESSURE: 132 MMHG | TEMPERATURE: 97.8 F | OXYGEN SATURATION: 96 % | BODY MASS INDEX: 35.92 KG/M2

## 2021-10-20 DIAGNOSIS — F32.5 DEPRESSION, MAJOR, IN REMISSION (HCC): ICD-10-CM

## 2021-10-20 DIAGNOSIS — Z00.00 MEDICARE ANNUAL WELLNESS VISIT, SUBSEQUENT: Primary | ICD-10-CM

## 2021-10-20 DIAGNOSIS — R79.89 ELEVATED TSH: ICD-10-CM

## 2021-10-20 DIAGNOSIS — N18.31 STAGE 3A CHRONIC KIDNEY DISEASE: ICD-10-CM

## 2021-10-20 PROBLEM — Z76.89 ENCOUNTER TO ESTABLISH CARE: Status: RESOLVED | Noted: 2021-07-20 | Resolved: 2021-10-20

## 2021-10-20 PROCEDURE — G0439 PPPS, SUBSEQ VISIT: HCPCS | Performed by: FAMILY MEDICINE

## 2021-10-20 ASSESSMENT — ACTIVITIES OF DAILY LIVING (ADL): BATHING_REQUIRES_ASSISTANCE: 0

## 2021-10-20 ASSESSMENT — ENCOUNTER SYMPTOMS: GENERAL WELL-BEING: GOOD

## 2021-10-20 ASSESSMENT — PATIENT HEALTH QUESTIONNAIRE - PHQ9: CLINICAL INTERPRETATION OF PHQ2 SCORE: 0

## 2021-10-20 ASSESSMENT — FIBROSIS 4 INDEX: FIB4 SCORE: 2.06

## 2021-10-20 NOTE — ASSESSMENT & PLAN NOTE
Patient is here today for her wellness exam.  She is having no troubles on today's visit and states she feels very well.  She is not due for any medications at the present time.

## 2021-10-20 NOTE — ASSESSMENT & PLAN NOTE
This is a chronic problem.  Patient has very minimal increase in her TSH without any symptoms.  We discussed that again we will continue to follow.

## 2021-10-20 NOTE — PROGRESS NOTES
Chief Complaint   Patient presents with   • Annual Wellness Visit         HPI:  Kayode Winters is a 88 y.o. here for Medicare Annual Wellness Visit   Medicare annual wellness visit, subsequent  Patient is here today for her wellness exam.  She is having no troubles on today's visit and states she feels very well.  She is not due for any medications at the present time.    Elevated TSH  This is a chronic problem.  Patient has very minimal increase in her TSH without any symptoms.  We discussed that again we will continue to follow.    Stage 3a chronic kidney disease (HCC)  This is a chronic problem.  Continue to follow.  Continue to avoid NSAIDs.    Depression, major, in remission (HCC)  This is a chronic problem in remission.  Patient not currently on any medications.      Patient Active Problem List    Diagnosis Date Noted   • Medicare annual wellness visit, subsequent 10/20/2021   • Depression, major, in remission (HCC) 10/20/2021   • Stage 3a chronic kidney disease (HCC) 07/20/2021   • Mixed hyperlipidemia 07/20/2021   • Screening for cardiovascular condition 07/20/2021   • Elevated TSH 07/20/2021   • Patient is Holiness 02/20/2018   • Vitamin D deficiency 02/20/2018   • Memory impairment 12/05/2017   • Low back pain 10/25/2016   • Hip pain 01/22/2014   • Obesity 01/22/2014       No current outpatient medications on file.     No current facility-administered medications for this visit.            Current supplements as per medication list.       Allergies: Amoxicillin, Novocain, Other food, Bloodless, Peanut-derived, and Red dye #40 [fd&c red #40]    Current social contact/activities: active     She  reports that she has never smoked. She has never used smokeless tobacco. She reports current alcohol use. She reports that she does not use drugs.  Counseling given: Not Answered        DPA/Advanced Directive:  Patient does not have an Advanced Directive.  A packet and workshop information was given  on Advanced Directives.      ROS:    Gait: Uses no assistive device   Ostomy: no   Other tubes: no   Amputations: no   Chronic oxygen use: no   Last eye exam:2021   : Denies any urinary leakage during the last 6 months incontinence.       Screening:  done  Depression Screening    Little interest or pleasure in doing things?  0 - not at all  Feeling down, depressed , or hopeless? 0 - not at all  Patient Health Questionnaire Score: 0     If depressive symptoms identified deferred to follow up visit unless specifically addressed in assessment and plan.    Interpretation of PHQ-9 Total Score   Score Severity   1-4 No Depression   5-9 Mild Depression   10-14 Moderate Depression   15-19 Moderately Severe Depression   20-27 Severe Depression    Screening for Cognitive Impairment    Three Minute Recall (captain, garden, picture) 3/3 3/3, captain, garden, picture  Juan Jose clock face with all 12 numbers and set the hands to show 5 past 8.  Yes 5/5; 8:05  Cognitive concerns identified deferred for follow up unless specifically addressed in assessment and plan.    Fall Risk Assessment    Has the patient had two or more falls in the last year or any fall with injury in the last year?  No    Safety Assessment    Throw rugs on floor.  Yes  Handrails on all stairs.  No  Good lighting in all hallways.  Yes  Difficulty hearing.  No  Patient counseled about all safety risks that were identified.    Functional Assessment ADLs    Are there any barriers preventing you from cooking for yourself or meeting nutritional needs?  No.    Are there any barriers preventing you from driving safely or obtaining transportation?  No.    Are there any barriers preventing you from using a telephone or calling for help?  No.    Are there any barriers preventing you from shopping?  No.    Are there any barriers preventing you from taking care of your own finances?  No.    Are there any barriers preventing you from managing your medications?  No.    Are  "there any barriers preventing you from showering, bathing or dressing yourself?  No.    Are you currently engaging in any exercise or physical activity?  Yes.  walking  What is your perception of your health?  Good.      Health Maintenance Summary                IMM ZOSTER VACCINES Postponed 12/20/2021 Originally 4/1/2020. Patient Refused     Done 2/5/2020 Imm Admin: Zoster Vaccine Recombinant (RZV) (SHINGRIX)     Patient has more history with this topic...    IMM DTaP/Tdap/Td Vaccine Postponed 7/20/2022 Originally 4/26/1952. Patient Refused    MAMMOGRAM Next Due 9/1/2022      Done 9/1/2021 MA-SCREENING MAMMO BILAT W/TOMOSYNTHESIS W/CAD     Patient has more history with this topic...    BONE DENSITY Next Due 9/1/2026      Done 9/1/2021 DS-BONE DENSITY STUDY (DEXA)     Patient has more history with this topic...          Patient Care Team:  Joby Gusman III, M.D. as PCP - General (Family Medicine)        Social History     Tobacco Use   • Smoking status: Never Smoker   • Smokeless tobacco: Never Used   Vaping Use   • Vaping Use: Never used   Substance Use Topics   • Alcohol use: Yes     Alcohol/week: 0.0 oz     Comment: rare.    • Drug use: Never     Family History   Problem Relation Age of Onset   • Depression Sister    • Genetic Disorder Neg Hx      She  has a past medical history of Hip pain (1/22/2014) and Mild depression (HCC). She also has no past medical history of Arthritis, ASTHMA, Breast cancer (HCC), Diabetic neuropathy (HCC), or GERD (gastroesophageal reflux disease).   Past Surgical History:   Procedure Laterality Date   • APPENDECTOMY         Exam:     /62 (BP Location: Left arm, Patient Position: Sitting, BP Cuff Size: Large adult)   Pulse 67   Temp 36.6 °C (97.8 °F) (Temporal)   Resp 14   Ht 1.626 m (5' 4\")   Wt 95.4 kg (210 lb 6.4 oz)   SpO2 96%  Body mass index is 36.12 kg/m².    Hearing excellent.    Dentition good  Alert, oriented in no acute distress.  Eye contact is good, speech " goal directed, affect calm      Assessment and Plan. The following treatment and monitoring plan is recommended:    1. Medicare annual wellness visit, subsequent     2. Elevated TSH     3. Stage 3a chronic kidney disease (HCC)     4. Depression, major, in remission (HCC)           Services suggested: No services needed at this time  Health Care Screening: Age-appropriate preventive services Medicare covers discussed today and ordered if indicated.  Referrals offered: Community-based lifestyle interventions to reduce health risks and promote self-management and wellness, fall prevention, nutrition, physical activity, tobacco-use cessation, weight loss, and mental health services as per orders if indicated.    Discussion today about general wellness and lifestyle habits:    · Prevent falls and reduce trip hazards; Cautioned about securing or removing rugs.  · Have a working fire alarm and carbon monoxide detector;   · Engage in regular physical activity and social activities       Follow-up: Return in about 6 months (around 4/20/2022) for Long.

## 2021-10-20 NOTE — PROGRESS NOTES
Chief Complaint   Patient presents with   • Annual Wellness Visit         HPI:  Kayode is a 88 y.o. here for Medicare Annual Wellness Visit        Patient Active Problem List    Diagnosis Date Noted   • Stage 3a chronic kidney disease (HCC) 07/20/2021   • Mixed hyperlipidemia 07/20/2021   • Screening for cardiovascular condition 07/20/2021   • Elevated TSH 07/20/2021   • Encounter to establish care 07/20/2021   • Patient is Islam 02/20/2018   • Vitamin D deficiency 02/20/2018   • Memory impairment 12/05/2017   • Low back pain 10/25/2016   • Hip pain 01/22/2014   • Obesity 01/22/2014       No current outpatient medications on file.     No current facility-administered medications for this visit.        Patient is taking medications as noted in medication list.  Current supplements as per medication list.     Allergies: Amoxicillin, Novocain, Other food, Bloodless, Peanut-derived, and Red dye #40 [fd&c red #40]    Current social contact/activities: walking    Is patient current with immunizations? Yes    She  reports that she has never smoked. She has never used smokeless tobacco. She reports current alcohol use. She reports that she does not use drugs.  Counseling given: Not Answered        DPA/Advanced directive: Patient does not have an Advanced Directive.  A packet and workshop information was given on Advanced Directives.    ROS:    Gait: Uses no assistive device   Ostomy: No   Other tubes: No   Amputations: No   Chronic oxygen use No   Last eye exam   Wears hearing aids: No   : Denies any urinary leakage during the last 6 months      Screening:  Annual Health Assessment Questions:    1.  Are you currently engaging in any exercise or physical activity? Yes    2.  How would you describe your mood or emotional well-being today? good    3.  Have you had any falls in the last year? No    4.  Have you noticed any problems with your balance or had difficulty walking? No    5.  In the last six months have  you experienced any leakage of urine? No    6. DPA/Advanced Directive: Patient does not have an Advanced Directive.  A packet and workshop information was given on Advanced Directives.        Depression Screening    Little interest or pleasure in doing things?  0 - not at all  Feeling down, depressed, or hopeless? 0 - not at all  Patient Health Questionnaire Score: 0    If depressive symptoms identified deferred to follow up visit unless specifically addressed in assessment and plan.    Interpretation of PHQ-9 Total Score   Score Severity   1-4 No Depression   5-9 Mild Depression   10-14 Moderate Depression   15-19 Moderately Severe Depression   20-27 Severe Depression    Screening for Cognitive Impairment    Three Minute Recall (captain, garden, picture)  3/3 3/3, captain, garden, picture  Juan Jose clock face with all 12 numbers and set the hands to show 5 past 8.  Yes 5/5; 8:05  If cognitive concerns identified, deferred for follow up unless specifically addressed in assessment and plan.    Fall Risk Assessment    Has the patient had two or more falls in the last year or any fall with injury in the last year?  No  If fall risk identified, deferred for follow up unless specifically addressed in assessment and plan.    Safety Assessment    Throw rugs on floor.  Yes  Handrails on all stairs.  No  Good lighting in all hallways.  Yes  Difficulty hearing.  No  Patient counseled about all safety risks that were identified.    Functional Assessment ADLs    Are there any barriers preventing you from cooking for yourself or meeting nutritional needs?  No.    Are there any barriers preventing you from driving safely or obtaining transportation?  No.    Are there any barriers preventing you from using a telephone or calling for help?  No.    Are there any barriers preventing you from shopping?  No.    Are there any barriers preventing you from taking care of your own finances?  No.    Are there any barriers preventing you from  "managing your medications?  No.    Are there any barriers preventing you from showering, bathing or dressing yourself?  No.    Are you currently engaging in any exercise or physical activity?  Yes.  walking  What is your perception of your health?  Good.    Health Maintenance Summary                Annual Wellness Visit Overdue 4/26/1933     IMM ZOSTER VACCINES Postponed 12/20/2021 Originally 4/1/2020. Patient Refused     Done 2/5/2020 Imm Admin: Zoster Vaccine Recombinant (RZV) (SHINGRIX)     Patient has more history with this topic...    IMM DTaP/Tdap/Td Vaccine Postponed 7/20/2022 Originally 4/26/1952. Patient Refused    MAMMOGRAM Next Due 9/1/2022      Done 9/1/2021 MA-SCREENING MAMMO BILAT W/TOMOSYNTHESIS W/CAD     Patient has more history with this topic...    BONE DENSITY Next Due 9/1/2026      Done 9/1/2021 DS-BONE DENSITY STUDY (DEXA)     Patient has more history with this topic...          Patient Care Team:  Joby Gusman III, M.D. as PCP - General (Family Medicine)    Social History     Tobacco Use   • Smoking status: Never Smoker   • Smokeless tobacco: Never Used   Vaping Use   • Vaping Use: Never used   Substance Use Topics   • Alcohol use: Yes     Alcohol/week: 0.0 oz     Comment: rare.    • Drug use: Never     Family History   Problem Relation Age of Onset   • Depression Sister    • Genetic Disorder Neg Hx      She  has a past medical history of Hip pain (1/22/2014) and Mild depression (HCC). She also has no past medical history of Arthritis, ASTHMA, Breast cancer (HCC), Diabetic neuropathy (HCC), or GERD (gastroesophageal reflux disease).   Past Surgical History:   Procedure Laterality Date   • APPENDECTOMY             Exam:     /62 (BP Location: Left arm, Patient Position: Sitting, BP Cuff Size: Large adult)   Pulse 67   Temp 36.6 °C (97.8 °F) (Temporal)   Resp 14   Ht 1.626 m (5' 4\")   Wt 95.4 kg (210 lb 6.4 oz)   SpO2 96%  Body mass index is 36.12 kg/m².    Hearing good.    Dentition " good  Alert, oriented in no acute distress.  Eye contact is good, speech goal directed, affect calm      Assessment and Plan. The following treatment and monitoring plan is recommended:    No diagnosis found.      Services suggested: No services needed at this time  Health Care Screening recommendations as per orders if indicated.  Referrals offered: PT/OT/Nutrition counseling/Behavioral Health/Smoking cessation as per orders if indicated.    Discussion today about general wellness and lifestyle habits:    · Prevent falls and reduce trip hazards; Cautioned about securing or removing rugs.  · Have a working fire alarm and carbon monoxide detector;   · Engage in regular physical activity and social activities       Follow-up: No follow-ups on file.

## 2021-12-22 ENCOUNTER — HOSPITAL ENCOUNTER (EMERGENCY)
Facility: MEDICAL CENTER | Age: 86
End: 2021-12-22
Attending: EMERGENCY MEDICINE
Payer: MEDICARE

## 2021-12-22 VITALS
OXYGEN SATURATION: 98 % | TEMPERATURE: 98.8 F | RESPIRATION RATE: 18 BRPM | WEIGHT: 218.48 LBS | HEART RATE: 76 BPM | HEIGHT: 65 IN | DIASTOLIC BLOOD PRESSURE: 75 MMHG | BODY MASS INDEX: 36.4 KG/M2 | SYSTOLIC BLOOD PRESSURE: 157 MMHG

## 2021-12-22 DIAGNOSIS — S81.812A LACERATION OF LEFT LOWER LEG, INITIAL ENCOUNTER: ICD-10-CM

## 2021-12-22 DIAGNOSIS — R55 SYNCOPE, UNSPECIFIED SYNCOPE TYPE: ICD-10-CM

## 2021-12-22 LAB
ANION GAP SERPL CALC-SCNC: 12 MMOL/L (ref 7–16)
BASOPHILS # BLD AUTO: 0.4 % (ref 0–1.8)
BASOPHILS # BLD: 0.03 K/UL (ref 0–0.12)
BUN SERPL-MCNC: 19 MG/DL (ref 8–22)
CALCIUM SERPL-MCNC: 9.2 MG/DL (ref 8.4–10.2)
CHLORIDE SERPL-SCNC: 105 MMOL/L (ref 96–112)
CO2 SERPL-SCNC: 23 MMOL/L (ref 20–33)
CREAT SERPL-MCNC: 0.96 MG/DL (ref 0.5–1.4)
EKG IMPRESSION: NORMAL
EOSINOPHIL # BLD AUTO: 0.1 K/UL (ref 0–0.51)
EOSINOPHIL NFR BLD: 1.2 % (ref 0–6.9)
ERYTHROCYTE [DISTWIDTH] IN BLOOD BY AUTOMATED COUNT: 48.1 FL (ref 35.9–50)
GLUCOSE SERPL-MCNC: 101 MG/DL (ref 65–99)
HCT VFR BLD AUTO: 41.5 % (ref 37–47)
HGB BLD-MCNC: 13.9 G/DL (ref 12–16)
IMM GRANULOCYTES # BLD AUTO: 0.02 K/UL (ref 0–0.11)
IMM GRANULOCYTES NFR BLD AUTO: 0.2 % (ref 0–0.9)
LYMPHOCYTES # BLD AUTO: 3.64 K/UL (ref 1–4.8)
LYMPHOCYTES NFR BLD: 44.6 % (ref 22–41)
MCH RBC QN AUTO: 33.1 PG (ref 27–33)
MCHC RBC AUTO-ENTMCNC: 33.5 G/DL (ref 33.6–35)
MCV RBC AUTO: 98.8 FL (ref 81.4–97.8)
MONOCYTES # BLD AUTO: 0.62 K/UL (ref 0–0.85)
MONOCYTES NFR BLD AUTO: 7.6 % (ref 0–13.4)
NEUTROPHILS # BLD AUTO: 3.75 K/UL (ref 2–7.15)
NEUTROPHILS NFR BLD: 46 % (ref 44–72)
NRBC # BLD AUTO: 0 K/UL
NRBC BLD-RTO: 0 /100 WBC
PLATELET # BLD AUTO: 207 K/UL (ref 164–446)
PMV BLD AUTO: 9 FL (ref 9–12.9)
POTASSIUM SERPL-SCNC: 4.5 MMOL/L (ref 3.6–5.5)
RBC # BLD AUTO: 4.2 M/UL (ref 4.2–5.4)
SODIUM SERPL-SCNC: 140 MMOL/L (ref 135–145)
WBC # BLD AUTO: 8.2 K/UL (ref 4.8–10.8)

## 2021-12-22 PROCEDURE — 700101 HCHG RX REV CODE 250: Performed by: EMERGENCY MEDICINE

## 2021-12-22 PROCEDURE — 80048 BASIC METABOLIC PNL TOTAL CA: CPT

## 2021-12-22 PROCEDURE — 93005 ELECTROCARDIOGRAM TRACING: CPT | Performed by: EMERGENCY MEDICINE

## 2021-12-22 PROCEDURE — 85025 COMPLETE CBC W/AUTO DIFF WBC: CPT

## 2021-12-22 PROCEDURE — 99284 EMERGENCY DEPT VISIT MOD MDM: CPT

## 2021-12-22 PROCEDURE — 304217 HCHG IRRIGATION SYSTEM

## 2021-12-22 RX ORDER — BACITRACIN ZINC AND POLYMYXIN B SULFATE 500; 1000 [USP'U]/G; [USP'U]/G
OINTMENT TOPICAL ONCE
Status: COMPLETED | OUTPATIENT
Start: 2021-12-22 | End: 2021-12-22

## 2021-12-22 RX ADMIN — Medication 1 EACH: at 21:51

## 2021-12-22 ASSESSMENT — FIBROSIS 4 INDEX: FIB4 SCORE: 2.06

## 2021-12-23 NOTE — ED NOTES
Pt. Endorses some dizziness prior to event, states it has since resolved. Denies CP, SOB, or H/N/B pain. States this episode occurred yesterday at approx 0900.

## 2021-12-23 NOTE — ED PROVIDER NOTES
ER Provider Note     Scribed for Shabbir Oreilly M.D. by Francisco Ricardo. 12/22/2021, 9:25 PM.    Primary Care Provider: Joby Gusman III, M.D.  Means of Arrival: Walk-in   History obtained from: Patient  History limited by: None     CHIEF COMPLAINT  Chief Complaint   Patient presents with    Syncope    T-5000 Lacerations       HPI  Kayode Winters is a 88 y.o. female who presents to the Emergency Department for evaluation following a syncopal episode that occurred yesterday morning. She reports that she stood up too quickly and became very lightheaded and then passed out. She hit her leg on the furniture and sustained a laceration to her left lower extremity. Her daughter brought her in for evaluation as she was concerned about possible infection. No head injury or neck pain.    REVIEW OF SYSTEMS  See HPI for further details. All other systems are negative.     PAST MEDICAL HISTORY   has a past medical history of Hip pain (1/22/2014) and Mild depression ([facility]).    SURGICAL HISTORY   has a past surgical history that includes appendectomy.    SOCIAL HISTORY  Social History     Tobacco Use    Smoking status: Never Smoker    Smokeless tobacco: Never Used   Vaping Use    Vaping Use: Never used   Substance Use Topics    Alcohol use: Yes     Alcohol/week: 0.0 oz     Comment: rare.     Drug use: Never      Social History     Substance and Sexual Activity   Drug Use Never       FAMILY HISTORY  Family History   Problem Relation Age of Onset    Depression Sister     Genetic Disorder Neg Hx        CURRENT MEDICATIONS  No current outpatient medications    ALLERGIES  Allergies   Allergen Reactions    Amoxicillin Unspecified     Per pt states to getting extreme weakness and pt could not move much, this then got followed by body aches    Novocain Contact Dermatitis    Other Food Contact Dermatitis     Pt is allergic to peanuts and peanut products.    Bloodless     Peanut-Derived     Red Dye #40 [Fd&C Red #40]        PHYSICAL  "EXAM  VITAL SIGNS: BP (!) 162/71   Pulse 77   Temp 37.2 °C (99 °F) (Temporal)   Resp 18   Ht 1.651 m (5' 5\")   Wt 99.1 kg (218 lb 7.6 oz)   SpO2 96%   BMI 36.36 kg/m²      Constitutional: Alert in no apparent distress.  HENT: No signs of trauma, Bilateral external ears normal, Nose normal.   Eyes: Pupils are equal and reactive, Conjunctiva normal, Non-icteric.   Neck: Normal range of motion, No tenderness, Supple, No stridor.   Lymphatic: No lymphadenopathy noted.   Cardiovascular: Regular rate and rhythm, no palpable thrill  Thorax & Lungs: No respiratory distress,  No chest tenderness.  CTAB  Abdomen: Bowel sounds normal, Soft, No tenderness, No masses, No pulsatile masses. No peritoneal signs.  Skin: Warm, Dry, No erythema, No rash.   Back: No bony tenderness, No CVA tenderness.   Extremities: Intact distal pulses, No edema, No tenderness, No cyanosis.  Musculoskeletal: Good range of motion in all major joints. No tenderness to palpation or major deformities noted. 4 cm partial thickness abrasion/laceration to left lower extremity that is over 24 hours old. No redness or warmth.  Neurologic: Alert , Normal motor function, Normal sensory function, No focal deficits noted.   Psychiatric: Affect normal, Judgment normal, Mood normal.     DIAGNOSTIC STUDIES / PROCEDURES    EKG Interpretation:  Interpreted by me    12 Lead EKG interpreted by me to show:  Normal sinus rhythm  Rate 68  Axis: Normal  Intervals: Normal  No ST-T wave changes and no ectopy with no Brugada syndrome or any hypertrophic cardiomyopathy and no preexcitation and normal intervals  My impression of this EKG: Does not indicate ischemia or arrhythmia at this time.       LABS  Labs Reviewed   CBC WITH DIFFERENTIAL - Abnormal; Notable for the following components:       Result Value    MCV 98.8 (*)     MCH 33.1 (*)     MCHC 33.5 (*)     Lymphocytes 44.60 (*)     All other components within normal limits    Narrative:     Collected By:   BASIC " METABOLIC PANEL - Abnormal; Notable for the following components:    Glucose 101 (*)     All other components within normal limits    Narrative:     Collected By:   ESTIMATED GFR - Abnormal; Notable for the following components:    GFR If Non  55 (*)     All other components within normal limits    Narrative:     Collected By:     All labs reviewed by me.    COURSE & MEDICAL DECISION MAKING  Pertinent Labs & Imaging studies reviewed. (See chart for details)    This is a 88 y.o. female that presents with a syncopal episode after standing up quickly.  The patient does have a wound on her leg that is not repairable given that is over 24 hours old.  We will get a screening EKG to evaluate for arrhythmogenic causes of syncope as well as evaluate the patient for electrolyte derangement as well as anemia..     9:25 PM - Patient seen and examined at bedside. Ordered EKG, CBC with differential, and BMP. Informed patient and daughter that as her laceration is over 24 hours old it cannot be sutured due to risk of infection. Her wound will be dressed with Polysporin. They are understanding and agree with this plan of care.    The patient has no leukocytosis and no significant anemia.  There is no significant electrolyte derangements.  The wound was cleaned.  We will discharge the patient home with strict return precautions and follow-up.    FINAL IMPRESSION  1. Laceration of left lower leg, initial encounter    2. Syncope, unspecified syncope type          I, Francisco Ricardo (Fozia), am scribing for, and in the presence of, Shabbir Oreilly M.D..    Electronically signed by: Francisco Ricardo (Fozia), 12/22/2021    IShabbir M.D. personally performed the services described in this documentation, as scribed by Francisco Ricardo in my presence, and it is both accurate and complete.     The note accurately reflects work and decisions made by me.  Shabbir Oreilly M.D.  12/23/2021  12:02 AM

## 2021-12-23 NOTE — ED TRIAGE NOTES
"Pt amb to triage c/o t5000 L leg laceration r/t syncopal episode last noc. Pt states she 'got up too quickly' while painting her baseboards and \"passed-out', and she hit her leg on the furniture.   "

## 2022-01-19 ENCOUNTER — OFFICE VISIT (OUTPATIENT)
Dept: MEDICAL GROUP | Facility: PHYSICIAN GROUP | Age: 87
End: 2022-01-19
Payer: MEDICARE

## 2022-01-19 VITALS
HEIGHT: 65 IN | HEART RATE: 92 BPM | SYSTOLIC BLOOD PRESSURE: 134 MMHG | TEMPERATURE: 98.9 F | BODY MASS INDEX: 34.82 KG/M2 | DIASTOLIC BLOOD PRESSURE: 68 MMHG | RESPIRATION RATE: 16 BRPM | WEIGHT: 209 LBS | OXYGEN SATURATION: 94 %

## 2022-01-19 DIAGNOSIS — N18.31 STAGE 3A CHRONIC KIDNEY DISEASE: ICD-10-CM

## 2022-01-19 DIAGNOSIS — R55 VASOVAGAL SYNCOPE: ICD-10-CM

## 2022-01-19 DIAGNOSIS — G89.29 CHRONIC MIDLINE LOW BACK PAIN WITHOUT SCIATICA: ICD-10-CM

## 2022-01-19 DIAGNOSIS — I70.0 ATHEROSCLEROSIS OF AORTA (HCC): ICD-10-CM

## 2022-01-19 DIAGNOSIS — Z91.010 PEANUT ALLERGY: ICD-10-CM

## 2022-01-19 DIAGNOSIS — F32.5 DEPRESSION, MAJOR, IN REMISSION (HCC): ICD-10-CM

## 2022-01-19 DIAGNOSIS — M54.50 CHRONIC MIDLINE LOW BACK PAIN WITHOUT SCIATICA: ICD-10-CM

## 2022-01-19 DIAGNOSIS — S81.812A LEG LACERATION, LEFT, INITIAL ENCOUNTER: ICD-10-CM

## 2022-01-19 PROBLEM — Z00.00 MEDICARE ANNUAL WELLNESS VISIT, SUBSEQUENT: Status: RESOLVED | Noted: 2021-10-20 | Resolved: 2022-01-19

## 2022-01-19 PROBLEM — Z13.6 SCREENING FOR CARDIOVASCULAR CONDITION: Status: RESOLVED | Noted: 2021-07-20 | Resolved: 2022-01-19

## 2022-01-19 PROCEDURE — 99214 OFFICE O/P EST MOD 30 MIN: CPT | Performed by: FAMILY MEDICINE

## 2022-01-19 RX ORDER — EPINEPHRINE 0.3 MG/.3ML
INJECTION SUBCUTANEOUS
Qty: 2 EACH | Refills: 1 | Status: SHIPPED | OUTPATIENT
Start: 2022-01-19 | End: 2022-04-21

## 2022-01-19 ASSESSMENT — PATIENT HEALTH QUESTIONNAIRE - PHQ9: CLINICAL INTERPRETATION OF PHQ2 SCORE: 0

## 2022-01-19 ASSESSMENT — FIBROSIS 4 INDEX: FIB4 SCORE: 2.2

## 2022-01-19 NOTE — PROGRESS NOTES
Annual Health Assessment Questions:    1.  Are you currently engaging in any exercise or physical activity? Yes    2.  How would you describe your mood or emotional well-being today? good    3.  Have you had any falls in the last year? Yes    4.  Have you noticed any problems with your balance or had difficulty walking? Yes    5.  In the last six months have you experienced any leakage of urine? No    6. DPA/Advanced Directive: Patient has Durable Power of  on file.

## 2022-01-20 NOTE — ASSESSMENT & PLAN NOTE
This is an acute problem.  Patient was seen in the emergency room last month after having a syncopal episode and injury to her left lower leg.  She is here to follow-up on her laceration.  She states it is no longer throbbing but it has not healed completely yet.

## 2022-01-20 NOTE — ASSESSMENT & PLAN NOTE
This is a chronic problem.  Daughter was here to go over the lab test done in the emergency room that showed continuation of her mild decrease in GFR.  This was explained to the patient and daughter.  Patient was told to avoid NSAIDs.

## 2022-01-20 NOTE — ASSESSMENT & PLAN NOTE
This is an acute problem.  Patient had a one-time episode of syncopal episode a month ago.  It happened after she got up from bending over working on something on the floor.  She is never had this before.  And has not had any trouble since.  Her daughters have her drinking more fluid than normal now.

## 2022-01-20 NOTE — ASSESSMENT & PLAN NOTE
This is a chronic problem.  Daughter would like the patient to have an EpiPen is the patient has a severe peanut allergy.  She wants to have it available in case anything ever happens.

## 2022-01-20 NOTE — ASSESSMENT & PLAN NOTE
This is a chronic problem.  Patient has troubles with low back pain and is getting to the point that it affects her ability to bend over and even lift her legs up.  We talked about possibly going to physical therapy but they do not want to go due to the current COVID surge.  She is told she can try Tylenol and over-the-counter Voltaren gel to the area to see if that helps.  We also went over some exercises she can do on her own at home.  If that does not help she was told we could do current x-rays and MRI then get her to the pain clinic to see if a steroid injection would be beneficial.  She did not seem very interested in that.

## 2022-04-21 ENCOUNTER — OFFICE VISIT (OUTPATIENT)
Dept: URGENT CARE | Facility: PHYSICIAN GROUP | Age: 87
End: 2022-04-21
Payer: MEDICARE

## 2022-04-21 VITALS
BODY MASS INDEX: 33.32 KG/M2 | SYSTOLIC BLOOD PRESSURE: 154 MMHG | OXYGEN SATURATION: 96 % | HEART RATE: 72 BPM | WEIGHT: 200 LBS | DIASTOLIC BLOOD PRESSURE: 84 MMHG | TEMPERATURE: 98.8 F | HEIGHT: 65 IN | RESPIRATION RATE: 16 BRPM

## 2022-04-21 DIAGNOSIS — S81.802D OPEN WOUND OF LEFT LOWER LEG, SUBSEQUENT ENCOUNTER: ICD-10-CM

## 2022-04-21 PROCEDURE — 99203 OFFICE O/P NEW LOW 30 MIN: CPT | Performed by: PHYSICIAN ASSISTANT

## 2022-04-21 RX ORDER — MUPIROCIN CALCIUM 20 MG/G
1 CREAM TOPICAL 2 TIMES DAILY
Qty: 15 G | Refills: 0 | Status: SHIPPED | OUTPATIENT
Start: 2022-04-21 | End: 2022-05-11

## 2022-04-21 ASSESSMENT — FIBROSIS 4 INDEX: FIB4 SCORE: 2.2

## 2022-04-21 ASSESSMENT — ENCOUNTER SYMPTOMS
CHILLS: 0
NAUSEA: 0
FEVER: 0
VOMITING: 0

## 2022-04-22 NOTE — PROGRESS NOTES
"Subjective:   Kayode Winters  is a 88 y.o. female who presents for Fall (In December 21st and has a wound that hasn't healed up in (L) leg)      Fall  Pertinent negatives include no fever, nausea or vomiting.   Patient presents urgent care noting wound to left lower extremity that occurred around 4 months ago.  She states she did have a syncopal episode and a fall in the home.  She was evaluated in the emergency department at that time.  Skin tear was unable to be repaired in the emergency department.  Patient has been using topical antibiotic biotic ointment 3-4 times daily over the wound and keeping clean over the interim.  She notes over the last 1 week progression of increased achy discomfort surrounding wound.  She denies lower extremity swelling.  She denies purulent drainage.  She denies much redness seen around wound but notes slight increased discomfort.  She is concerned with infection.  She presents to urgent care requesting antibiotic coverage.  She did discuss this wound with her primary care provider around 3 months ago.    Review of Systems   Constitutional: Negative for chills and fever.   Gastrointestinal: Negative for nausea and vomiting.   Skin: Negative for rash.        Open wound LLE       Allergies   Allergen Reactions   • Amoxicillin Unspecified     Per pt states to getting extreme weakness and pt could not move much, this then got followed by body aches   • Novocain Contact Dermatitis   • Other Food Contact Dermatitis     Pt is allergic to peanuts and peanut products.   • Bloodless    • Peanut-Derived    • Red Dye #40 [Fd&C Red #40]         Objective:   /84 (BP Location: Left arm, Patient Position: Sitting, BP Cuff Size: Large adult)   Pulse 72   Temp 37.1 °C (98.8 °F) (Temporal)   Resp 16   Ht 1.651 m (5' 5\")   Wt 90.7 kg (200 lb)   SpO2 96%   BMI 33.28 kg/m²     Physical Exam  Vitals and nursing note reviewed.   Constitutional:       General: She is not in acute distress.     " Appearance: She is well-developed. She is not diaphoretic.   HENT:      Head: Normocephalic and atraumatic.      Right Ear: External ear normal.      Left Ear: External ear normal.      Nose: Nose normal.   Eyes:      General: Lids are normal. No scleral icterus.        Right eye: No discharge.         Left eye: No discharge.      Conjunctiva/sclera: Conjunctivae normal.   Pulmonary:      Effort: Pulmonary effort is normal. No respiratory distress.   Musculoskeletal:         General: Normal range of motion.      Cervical back: Neck supple.        Legs:    Skin:     General: Skin is warm and dry.      Coloration: Skin is not pale.      Findings: No erythema.   Neurological:      Mental Status: She is alert and oriented to person, place, and time. She is not disoriented.   Psychiatric:         Speech: Speech normal.         Behavior: Behavior normal.         Assessment/Plan:   1. Open wound of left lower leg, subsequent encounter  - mupirocin calcium (BACTROBAN) 2 % Cream; Apply 1 Application topically 2 times a day.  Dispense: 15 g; Refill: 0  - Referral to Wound Clinic    Topical bactroban due to minimal evidence of bacterial infection, history of decreased renal function and antibiotic allergies  Follow up with wound care and PCP  Return to clinic with lack of resolution or progression of symptoms.  ER precautions with any worsening symptoms are reviewed with patient/caregiver and they do express understanding    I have worn an N95 mask, gloves and eye protection for the entire encounter with this patient.     Differential diagnosis, natural history, supportive care, and indications for immediate follow-up discussed.

## 2022-04-25 DIAGNOSIS — S81.812A LEG LACERATION, LEFT, INITIAL ENCOUNTER: ICD-10-CM

## 2022-05-04 ENCOUNTER — NON-PROVIDER VISIT (OUTPATIENT)
Dept: WOUND CARE | Facility: MEDICAL CENTER | Age: 87
End: 2022-05-04
Attending: FAMILY MEDICINE
Payer: MEDICARE

## 2022-05-04 PROCEDURE — 99211 OFF/OP EST MAY X REQ PHY/QHP: CPT

## 2022-05-04 PROCEDURE — 97597 DBRDMT OPN WND 1ST 20 CM/<: CPT

## 2022-05-04 RX ORDER — MULTIVIT-MIN/IRON/FOLIC ACID/K 18-600-40
2000 CAPSULE ORAL DAILY
COMMUNITY

## 2022-05-04 RX ORDER — M-VIT,TX,IRON,MINS/CALC/FOLIC 27MG-0.4MG
1 TABLET ORAL DAILY
COMMUNITY

## 2022-05-04 RX ORDER — MULTIVIT WITH MINERALS/LUTEIN
TABLET ORAL
COMMUNITY

## 2022-05-04 NOTE — CERTIFICATION
Non Provider Encounter- Lower Extremity Ulcer    HISTORY OF PRESENT ILLNESS  Wound History:    START OF CARE IN CLINIC: 5/4/22    REFERRING PROVIDER: Dr. Yazmin Gusman     WOUND ETIOLOGY: Trauma   LOCATION: Left lower leg   HISTORY: 89 year old female presents with wound to left lower leg. Patient initially fell due to syncope on 12/21/21 and hit her leg on a piece of furniture. She went to the ED the next day for care; however it was determined not to suture the laceration due to possible infection and patient was advised to apply polysporin ointment. Patient follow-up with with her primary care doctor 1/19/22 which was noted wound healing normally & to allow more time to fully resolve. Patient followed up again 4/21/22 for her leg wound and was told to apply mupirocin ointment 2x/day; as well as a referral to [facility] was placed.     Pertinent Medical History:   Past Medical History:   Diagnosis Date   • Hip pain 1/22/2014   • Mild depression (HCC)       ETIOLOGY HISTORY: Diagnosed none Vascular Surgeon: none. Previous vascular procedures none. Compression none. Varicose Veins yes     TOBACCO USE: none    FALL RISK ASSESSMENT:    x65 years or older     xFall within the last 2 years   Uses ambulatory devices  Loss of protective sensation in feet   Use of prostethic/orthotic    Presence of lower extremity/foot/toe amputation   Taking medication that increases risk (per facility policy)    Interventions Recommended (if any of the above are selected):   Use of Assistive Device:   Supervision with ambulation: Caregiver   Assistance with ambulation: Caregiver   xHome safety education: Educational material discussed    MOST RECENT VASCULAR STUDIES: 4/7/15 to rule out DVT    Patient allergies and medications reviewed via Epic.     WOUND ASSESSMENT    Wound 05/04/22 Full Thickness Wound Leg Lower;Lateral Left (Active)   Wound Image    05/04/22 1300   Site Assessment Yellow;Red;Dry 05/04/22 1300   Periwound Assessment Scar  tissue;Satellite lesions;Edema 05/04/22 1300   Margins Attached edges 05/04/22 1300   Drainage Amount None 05/04/22 1300   Treatments Cleansed;Topical Lidocaine;CSWD - Conservative Sharp Wound Debridement 05/04/22 1300   Wound Cleansing Puracyn Gillette 05/04/22 1300   Periwound Protectant Skin Protectant Wipes to Periwound 05/04/22 1300   Dressing Cleansing/Solutions Normal Saline 05/04/22 1300   Dressing Options Collagen Dressing;Hydrofera Blue Ready;Tubigrip 05/04/22 1300   Dressing Changed Changed 05/04/22 1300   Dressing Change/Treatment Frequency Every 72 hrs, and As Needed 05/04/22 1300   Non-staged Wound Description Full thickness 05/04/22 1300   Wound Length (cm) 3.2 cm 05/04/22 1300   Wound Width (cm) 0.6 cm 05/04/22 1300   Wound Depth (cm) 0.1 cm 05/04/22 1300   Wound Surface Area (cm^2) 1.92 cm^2 05/04/22 1300   Wound Volume (cm^3) 0.192 cm^3 05/04/22 1300   Post-Procedure Length (cm) 3.2 cm 05/04/22 1300   Post-Procedure Width (cm) 0.6 cm 05/04/22 1300   Post-Procedure Depth (cm) 0.1 cm 05/04/22 1300   Post-Procedure Surface Area (cm^2) 1.92 cm^2 05/04/22 1300   Post-Procedure Volume (cm^3) 0.192 cm^3 05/04/22 1300   Wound Bed Slough (%) 90 % 05/04/22 1300   Tunneling (cm) 0 cm 05/04/22 1300   Undermining (cm) 0 cm 05/04/22 1300   Wound Odor None 05/04/22 1300   Pulses 2+;Doppler;DP;PT;Left 05/04/22 1300   Exposed Structures None 05/04/22 1300        PATIENT EDUCATION     Avoid prolonged standing or sitting without elevating your legs.  - Apply tubigrip to your legs ending 2 fingers below back of knee without wrinkles.   Remove stocking if your circulation changes.     Should you experience any significant changes in your wound(s), such as infection (redness, swelling, localized heat, increased pain, fever > 101 F, chills) or have any questions regarding your home care instructions, please contact the wound center at [phone]. If after hours, contact your primary care physician or go to the hospital  emergency room.   Keep dressing clean, dry and covered while bathing. Only change dressing if it becomes over saturated, soiled or falls off or this weekend once. Clean with water, then wipe skin prep around wound on intact skin, then apply collagen moistened with normal saline to wound bed, then cover with purple hydrofera blue and tape down with hypafix tape.     - Importance of managing edema for healing of ulcer.  -Elevate legs above the level of the heart periodically throughout the day.  - Importance of adequate nutrition for wound healing  -Increase protein intake (unless contraindicated by renal status)  -Advised to go to ER for any increased redness, swelling, drainage or odor, or if patient develops fever, chills, nausea or vomiting.

## 2022-05-04 NOTE — PATIENT INSTRUCTIONS
Avoid prolonged standing or sitting without elevating your legs.  - Apply tubigrip to your legs ending 2 fingers below back of knee without wrinkles.   Remove stocking if your circulation changes.     Should you experience any significant changes in your wound(s), such as infection (redness, swelling, localized heat, increased pain, fever > 101 F, chills) or have any questions regarding your home care instructions, please contact the wound center at [phone]. If after hours, contact your primary care physician or go to the hospital emergency room.   Keep dressing clean, dry and covered while bathing. Only change dressing if it becomes over saturated, soiled or falls off or this weekend once. Clean with water, then wipe skin prep around wound on intact skin, then apply collagen moistened with normal saline to wound bed, then cover with purple hydrofera blue and tape down with hypafix tape.     - Importance of managing edema for healing of ulcer.  -Elevate legs above the level of the heart periodically throughout the day.  - Importance of adequate nutrition for wound healing  -Increase protein intake (unless contraindicated by renal status)  -Advised to go to ER for any increased redness, swelling, drainage or odor, or if patient develops fever, chills, nausea or vomiting.

## 2022-05-04 NOTE — PROCEDURES
CSWD with curette to left lower leg lateral wound of <10cm2 to remove non-viable biofilm and slough.

## 2022-05-11 ENCOUNTER — OFFICE VISIT (OUTPATIENT)
Dept: WOUND CARE | Facility: MEDICAL CENTER | Age: 87
End: 2022-05-11
Attending: FAMILY MEDICINE
Payer: MEDICARE

## 2022-05-11 VITALS
DIASTOLIC BLOOD PRESSURE: 76 MMHG | TEMPERATURE: 96.8 F | SYSTOLIC BLOOD PRESSURE: 154 MMHG | RESPIRATION RATE: 18 BRPM | OXYGEN SATURATION: 94 % | HEART RATE: 70 BPM

## 2022-05-11 DIAGNOSIS — I87.2 VENOUS INSUFFICIENCY: ICD-10-CM

## 2022-05-11 DIAGNOSIS — S81.802D OPEN WOUND OF LEFT LOWER LEG, SUBSEQUENT ENCOUNTER: ICD-10-CM

## 2022-05-11 PROCEDURE — 99213 OFFICE O/P EST LOW 20 MIN: CPT | Mod: 25

## 2022-05-11 PROCEDURE — 99213 OFFICE O/P EST LOW 20 MIN: CPT | Mod: 25 | Performed by: NURSE PRACTITIONER

## 2022-05-11 PROCEDURE — 11042 DBRDMT SUBQ TIS 1ST 20SQCM/<: CPT | Performed by: NURSE PRACTITIONER

## 2022-05-11 PROCEDURE — 11042 DBRDMT SUBQ TIS 1ST 20SQCM/<: CPT

## 2022-05-11 ASSESSMENT — ENCOUNTER SYMPTOMS
CONSTIPATION: 0
COUGH: 0
SHORTNESS OF BREATH: 0
NAUSEA: 0
FEVER: 0
VOMITING: 0
DIARRHEA: 0
CHILLS: 0
CLAUDICATION: 0

## 2022-05-11 NOTE — PATIENT INSTRUCTIONS
-Keep your wound dressing clean, dry, and intact.    -Change your dressing as instructed and immediately if it becomes soiled, soaked, or falls off.    -Should you experience any significant changes in your wound(s), such as infection (redness, swelling, localized heat, increased pain, fever > 101 F, chills) or have any questions regarding your home care instructions, please contact the wound center at [phone]. If after hours, contact your primary care physician or go to the hospital emergency room.

## 2022-05-11 NOTE — PROGRESS NOTES
Provider Encounter- Full Thickness wound    HISTORY OF PRESENT ILLNESS  Wound History:   START OF CARE IN CLINIC: 5/4/22               REFERRING PROVIDER: Dr. Yazmin Gusman                 WOUND ETIOLOGY: Trauma              LOCATION: Left lower leg              HISTORY: 89 year old female presents with wound to left lower leg. Patient initially fell due to syncope on 12/21/21 and hit her leg on a piece of furniture. She went to the ED the next day for care; however it was determined not to suture the laceration due to possible infection and patient was advised to apply polysporin ointment. Patient follow-up with with her primary care doctor 1/19/22 which was noted wound healing normally & to allow more time to fully resolve. Patient followed up again 4/21/22 for her leg wound and was told to apply mupirocin ointment 2x/day; as well as a referral to [facility] was placed.       Pertinent Medical History: Obesity, edema of lower leg    TOBACCO USE:      Patient's problem list, allergies, and current medications reviewed and updated in Epic    Interval History:  5/11/2022 : Initial provider visit with TRACEE Karimi, ANA CRISTINA-BC, CWOMAIRAN, CFMAXIMUS.  Kayode states that she is feeling well.  She is tolerating compression without any difficulty.  The area of her wound has decreased since last assessment.  No evidence of infection today.      REVIEW OF SYSTEMS:   Review of Systems   Constitutional: Negative for chills and fever.   Respiratory: Negative for cough and shortness of breath.    Cardiovascular: Positive for leg swelling. Negative for chest pain and claudication.        She has had mild swelling in her legs for some time   Gastrointestinal: Negative for constipation, diarrhea, nausea and vomiting.   Skin: Negative for rash.       PHYSICAL EXAMINATION:   BP (!) 154/76 (BP Location: Right arm, Patient Position: Sitting) Comment: RN notified  Pulse 70   Temp 36 °C (96.8 °F)   Resp 18   SpO2 94%     Physical  Exam  Constitutional:       Appearance: She is obese.   HENT:      Head: Normocephalic.   Eyes:      Pupils: Pupils are equal, round, and reactive to light.   Cardiovascular:      Rate and Rhythm: Normal rate.      Comments: Pedal pulses palpable, 2+ bilaterally  Pulmonary:      Effort: Pulmonary effort is normal.   Musculoskeletal:      Right lower leg: Edema present.      Left lower leg: Edema present.      Comments: Plus edema bilateral lower extremities   Skin:     Comments: Full-thickness wound to left lateral lower leg  Refer to wound flowsheet and photos   Neurological:      Mental Status: She is oriented to person, place, and time.   Psychiatric:         Mood and Affect: Mood normal.         Behavior: Behavior normal.         Thought Content: Thought content normal.         Judgment: Judgment normal.         WOUND ASSESSMENT  Wound 05/04/22 Full Thickness Wound Leg Lower;Lateral Left (Active)   Wound Image    05/04/22 1300   Site Assessment Yellow;Red;Dry 05/04/22 1300   Periwound Assessment Scar tissue;Satellite lesions;Edema 05/04/22 1300   Margins Attached edges 05/04/22 1300   Drainage Amount None 05/04/22 1300   Treatments Cleansed;Topical Lidocaine;CSWD - Conservative Sharp Wound Debridement 05/04/22 1300   Wound Cleansing Puracyn [place] 05/04/22 1300   Periwound Protectant Skin Protectant Wipes to Periwound 05/04/22 1300   Dressing Cleansing/Solutions Normal Saline 05/04/22 1300   Dressing Options Collagen Dressing;Hydrofera Blue Ready;Tubigrip 05/04/22 1300   Dressing Changed Changed 05/04/22 1300   Dressing Change/Treatment Frequency Every 72 hrs, and As Needed 05/04/22 1300   Non-staged Wound Description Full thickness 05/04/22 1300   Wound Length (cm) 3.2 cm 05/04/22 1300   Wound Width (cm) 0.6 cm 05/04/22 1300   Wound Depth (cm) 0.1 cm 05/04/22 1300   Wound Surface Area (cm^2) 1.92 cm^2 05/04/22 1300   Wound Volume (cm^3) 0.192 cm^3 05/04/22 1300   Post-Procedure Length (cm) 3.2 cm 05/04/22 1300    Post-Procedure Width (cm) 0.6 cm 05/04/22 1300   Post-Procedure Depth (cm) 0.1 cm 05/04/22 1300   Post-Procedure Surface Area (cm^2) 1.92 cm^2 05/04/22 1300   Post-Procedure Volume (cm^3) 0.192 cm^3 05/04/22 1300   Wound Bed Slough (%) 90 % 05/04/22 1300   Tunneling (cm) 0 cm 05/04/22 1300   Undermining (cm) 0 cm 05/04/22 1300   Wound Odor None 05/04/22 1300   Pulses 2+;Doppler;DP;PT;Left 05/04/22 1300   Exposed Structures None 05/04/22 1300   Number of days: 7       PROCEDURE:   -2% viscous lidocaine applied topically to wound bed for approximately 5 minutes prior to debridement  -Curette used to debride wound bed.  Excisional debridement was performed to remove devitalized tissue until healthy, bleeding tissue was visualized.   Entire surface of wound, 1.92 cm2 debrided.  Tissue debrided into the subcutaneous layer.    -Bleeding controlled with manual pressure.    -Wound care completed by wound RN, refer to flowsheet  -Patient tolerated the procedure well, without c/o pain or discomfort.       Pertinent Labs and Diagnostics:    Labs:     A1c:   Lab Results   Component Value Date/Time    HBA1C 5.6 04/27/2017 10:08 AM          IMAGING: N/A    VASCULAR STUDIES: No recent studies found in epic    LAST  WOUND CULTURE:  DATE : No results found for: CULTRSULT      ASSESSMENT AND PLAN:     1. Open wound of left lower leg, subsequent encounter  Comment: Traumatic injury.  Patient fell during episode of syncope, struck her leg on a piece of furniture in December 2021 5/11/2022: Wound area has decreased in size assessment.    -Excisional debridement of wound in clinic today, medically necessary to promote wound healing.  -Patient to return to clinic weekly for assessment and debridement  -Patient to change dressing 1-2 times per week in between clinic visits    Wound care: Collagen to accelerate granulation, Hydrofera Blue to manage exudate, foam cover dressing, Tubigrip to manage edema    2.  Venous  insufficiency    5/11/2022: Patient has 2+ edema to bilateral extremities, hemosiderin staining, and skin changes indicative of CVI.  -Patient tolerating Tubigrip for edema.  Edema moderately managed.  Consider increasing to multilayer wrap if wound does not progress.  -Patient would likely benefit from compression stockings.        PATIENT EDUCATION  - Importance of adequate nutrition for wound healing  -Advised to go to ER for any increased redness, swelling, drainage, or odor, or if patient develops fever, chills, nausea or vomiting.     My total time spent caring for the patient on the day of the encounter was 20 minutes.   This does not include time spent on separately billable procedures/tests.       Please note that this note may have been created using voice recognition software. I have worked with technical experts from [employer] to optimize the interface.  I have made every reasonable attempt to correct obvious errors, but there may be errors of grammar and possibly content that I did not discover before finalizing the note.    N

## 2022-05-18 ENCOUNTER — OFFICE VISIT (OUTPATIENT)
Dept: WOUND CARE | Facility: MEDICAL CENTER | Age: 87
End: 2022-05-18
Attending: FAMILY MEDICINE
Payer: MEDICARE

## 2022-05-18 VITALS
SYSTOLIC BLOOD PRESSURE: 149 MMHG | HEART RATE: 71 BPM | TEMPERATURE: 97.7 F | DIASTOLIC BLOOD PRESSURE: 88 MMHG | OXYGEN SATURATION: 96 % | RESPIRATION RATE: 17 BRPM

## 2022-05-18 DIAGNOSIS — S81.802D OPEN WOUND OF LEFT LOWER LEG, SUBSEQUENT ENCOUNTER: Primary | ICD-10-CM

## 2022-05-18 DIAGNOSIS — I87.2 VENOUS INSUFFICIENCY: ICD-10-CM

## 2022-05-18 PROCEDURE — 11042 DBRDMT SUBQ TIS 1ST 20SQCM/<: CPT

## 2022-05-18 PROCEDURE — 99213 OFFICE O/P EST LOW 20 MIN: CPT | Performed by: NURSE PRACTITIONER

## 2022-05-18 PROCEDURE — 99213 OFFICE O/P EST LOW 20 MIN: CPT

## 2022-05-18 ASSESSMENT — ENCOUNTER SYMPTOMS
COUGH: 0
DIARRHEA: 0
CHILLS: 0
CLAUDICATION: 0
NAUSEA: 0
CONSTIPATION: 0
VOMITING: 0
FEVER: 0
SHORTNESS OF BREATH: 0

## 2022-05-18 NOTE — PROGRESS NOTES
"Pt was measured for circaids. L- 17\", mid calf - 17\", ankle - 10\".   I called [facility], asked about insurance coverage. As pt does not have a dx of CVI, and wound is a skin tear ins will not cover. I called pt's daughter Estelle and informed her of this. They would like to go ahead and order 2 pairs of circaids anyway, and will self-pay. I faxed an order signed by Ryan EASLEY to [facility]. Instr daughter to have pt bring them to next appointment after she receives them so we can show them how to don the circaids.   "

## 2022-05-18 NOTE — PATIENT INSTRUCTIONS
After Visit Summary Wound Care Instructions    Nutrition - Patient instructed increased protein diet unless contraindicated in renal failure (meat, eggs, fish, yogurt, cottage cheese, beans), use of multivitamin with minerals and Arginaid supplementation (check if ok with Primary Care Provider first).    Infection -  instructed signs and symptoms of infection, increased redness and swelling, localized heat over wound and surrounding area/fever/chills/nausea and vomiting, when to call doctor or go to Emergency Room.       Compression Wraps - Avoid prolonged standing or sitting without elevating your legs.  Multilayer compression wrap to left leg. Do not get wet and keep on for the week. Only remove if temperature or sensation changes.   Remove your compression wrap if you have severe pain, severe swelling, numbness, color change, or temperature change in your toes. If you need to remove your compression wrap, do so by unrolling it. Do not cut the compression wrap off to prevent cutting yourself on accident.    Questions - should you have any questions regarding your home care instructions, please contact the wound center at [phone]. If after hours, contact your primary care physician or go to the hospital emergency room.

## 2022-05-18 NOTE — PROGRESS NOTES
Provider Encounter- Full Thickness wound    HISTORY OF PRESENT ILLNESS  Wound History:   START OF CARE IN CLINIC: 5/4/22               REFERRING PROVIDER: Dr. Yazmin Gusman                 WOUND ETIOLOGY: Trauma              LOCATION: Left lower leg              HISTORY: 89 year old female presents with wound to left lower leg. Patient initially fell due to syncope on 12/21/21 and hit her leg on a piece of furniture. She went to the ED the next day for care; however it was determined not to suture the laceration due to possible infection and patient was advised to apply polysporin ointment. Patient follow-up with with her primary care doctor 1/19/22 which was noted wound healing normally & to allow more time to fully resolve. Patient followed up again 4/21/22 for her leg wound and was told to apply mupirocin ointment 2x/day; as well as a referral to [facility] was placed.       Pertinent Medical History: Obesity, edema of lower leg    TOBACCO USE:      Patient's problem list, allergies, and current medications reviewed and updated in Epic    Interval History:  5/11/2022 : Initial provider visit with TRACEE Karimi, FNLYNNE-BC, CWOCN, CFCN.  Kayode states that she is feeling well.  She is tolerating compression without any difficulty.  The area of her wound has decreased since last assessment.  No evidence of infection today.    5/18/2022: Clinic visit with TRACEE Veronica.  Thomas wound continues to progress overall she feels well denies fever chills.  Wound is more narrow by dimensions.  We will place an Unna boot with Coban for compression.  Discussed different compression stockings will measure the patient for CircAid compression stocking for the left lower extremity and placed an order with [employer].    REVIEW OF SYSTEMS:   Review of Systems   Constitutional: Negative for chills and fever.   Respiratory: Negative for cough and shortness of breath.    Cardiovascular: Positive for leg swelling. Negative for chest pain  and claudication.        She has had mild swelling in her legs for some time   Gastrointestinal: Negative for constipation, diarrhea, nausea and vomiting.   Skin: Negative for rash.       PHYSICAL EXAMINATION:   BP (!) 149/88 (BP Location: Left arm, Patient Position: Sitting)   Pulse 71   Temp 36.5 °C (97.7 °F) (Temporal)   Resp 17   SpO2 96%     Physical Exam  Constitutional:       Appearance: She is obese.   HENT:      Head: Normocephalic.   Eyes:      Pupils: Pupils are equal, round, and reactive to light.   Cardiovascular:      Rate and Rhythm: Normal rate.      Comments: Pedal pulses palpable, 2+ bilaterally  Pulmonary:      Effort: Pulmonary effort is normal.   Musculoskeletal:      Right lower leg: Edema present.      Left lower leg: Edema present.      Comments: Plus edema bilateral lower extremities   Skin:     Comments: Full-thickness wound to left lateral lower leg  Refer to wound flowsheet and photos   Neurological:      Mental Status: She is oriented to person, place, and time.   Psychiatric:         Mood and Affect: Mood normal.         Behavior: Behavior normal.         Thought Content: Thought content normal.         Judgment: Judgment normal.         WOUND ASSESSMENT  Wound 05/04/22 Full Thickness Wound Leg Lower;Lateral Left (Active)   Wound Image    05/11/22 1400   Site Assessment Red;[place]ald 05/18/22 1300   Periwound Assessment Scar tissue;Satellite lesions;Edema 05/18/22 1300   Margins Attached edges 05/18/22 1300   Closure Secondary intention 05/18/22 1300   Drainage Amount Scant 05/18/22 1300   Drainage Description Serosanguineous 05/18/22 1300   Treatments Cleansed 05/18/22 1300   Wound Cleansing Puracyn [place] 05/18/22 1300   Periwound Protectant Barrier Paste;Skin Protectant Wipes to Periwound 05/18/22 1300   Dressing Cleansing/Solutions Not Applicable 05/18/22 1300   Dressing Options Viscopaste;Unna Boot 05/18/22 1300   Dressing Changed New 05/18/22 1300   Dressing Status Clean;Intact;Dry  05/18/22 1300   Dressing Change/Treatment Frequency Every 72 hrs, and As Needed 05/18/22 1300   Non-staged Wound Description Full thickness 05/18/22 1300   Wound Length (cm) 2.7 cm 05/18/22 1300   Wound Width (cm) 0.4 cm 05/18/22 1300   Wound Depth (cm) 0.1 cm 05/18/22 1300   Wound Surface Area (cm^2) 1.08 cm^2 05/18/22 1300   Wound Volume (cm^3) 0.108 cm^3 05/18/22 1300   Post-Procedure Length (cm) 3 cm 05/18/22 1300   Post-Procedure Width (cm) 0.4 cm 05/18/22 1300   Post-Procedure Depth (cm) 0.1 cm 05/18/22 1300   Post-Procedure Surface Area (cm^2) 1.2 cm^2 05/18/22 1300   Post-Procedure Volume (cm^3) 0.12 cm^3 05/18/22 1300   Wound Healing % 44 05/18/22 1300   Wound Bed Granulation (%) 90 % 05/18/22 1300   Wound Bed Slough (%) 10 % 05/18/22 1300   Wound Bed Granulation (%) - Post-Procedure 100 % 05/18/22 1300   Tunneling (cm) 0 cm 05/18/22 1300   Undermining (cm) 0 cm 05/18/22 1300   Wound Odor None 05/18/22 1300   Pulses 2+;Doppler;DP;PT;Left 05/04/22 1300   Exposed Structures None 05/18/22 1300   Number of days: 14       PROCEDURE:   - no excisional debridement required in clinic today.      Pertinent Labs and Diagnostics:    Labs:     A1c:   Lab Results   Component Value Date/Time    HBA1C 5.6 04/27/2017 10:08 AM          IMAGING: N/A    VASCULAR STUDIES: No recent studies found in epic    LAST  WOUND CULTURE:  DATE : No results found for: CULTRSULT      ASSESSMENT AND PLAN:     1. Open wound of left lower leg, subsequent encounter  Comment: Traumatic injury.  Patient fell during episode of syncope, struck her leg on a piece of furniture in December 2021 5/18/2022: Wound area has decreased in dimensions.  -No excisional debridement required in clinic today.  Removal of dried collagen to the periwound area.  -Patient to return to clinic weekly for assessment and debridement  -Patient to change dressing 1-2 times per week in between clinic visits  -CircAid compression stockings to left lower extremity ordered  in clinic today.      Wound care: Unna boot with Coban.    2.  Venous insufficiency    5/18/2022: Patient has 2+ edema to bilateral extremities, hemosiderin staining, and skin changes indicative of CVI.  -Increased compression from 1 Tubigrip to Unna boot with Coban  -CircAid compression ordered for left lower extremity.  Educated the patient and family that bilateral lower extremity compression would support venous return and help prevent any future venous ulcers.  Furthermore, this would significantly help with fatigue legs and edema to bilateral lower extremities.        PATIENT EDUCATION  - Importance of adequate nutrition for wound healing  -Advised to go to ER for any increased redness, swelling, drainage, or odor, or if patient develops fever, chills, nausea or vomiting.     My total time spent caring for the patient on the day of the encounter was 20 minutes.   This does not include time spent on separately billable procedures/tests.       Please note that this note may have been created using voice recognition software. I have worked with technical experts from [facility] to optimize the interface.  I have made every reasonable attempt to correct obvious errors, but there may be errors of grammar and possibly content that I did not discover before finalizing the note.    N

## 2022-05-25 ENCOUNTER — OFFICE VISIT (OUTPATIENT)
Dept: WOUND CARE | Facility: MEDICAL CENTER | Age: 87
End: 2022-05-25
Attending: FAMILY MEDICINE
Payer: MEDICARE

## 2022-05-25 VITALS
SYSTOLIC BLOOD PRESSURE: 154 MMHG | RESPIRATION RATE: 18 BRPM | TEMPERATURE: 97.2 F | OXYGEN SATURATION: 94 % | HEART RATE: 69 BPM | DIASTOLIC BLOOD PRESSURE: 65 MMHG

## 2022-05-25 DIAGNOSIS — S81.802D OPEN WOUND OF LEFT LOWER LEG, SUBSEQUENT ENCOUNTER: ICD-10-CM

## 2022-05-25 DIAGNOSIS — I87.2 VENOUS INSUFFICIENCY: Primary | ICD-10-CM

## 2022-05-25 PROCEDURE — 99213 OFFICE O/P EST LOW 20 MIN: CPT | Performed by: NURSE PRACTITIONER

## 2022-05-25 PROCEDURE — 99212 OFFICE O/P EST SF 10 MIN: CPT

## 2022-05-25 ASSESSMENT — ENCOUNTER SYMPTOMS
SHORTNESS OF BREATH: 0
FEVER: 0
DIARRHEA: 0
VOMITING: 0
CLAUDICATION: 0
COUGH: 0
CONSTIPATION: 0
NAUSEA: 0
CHILLS: 0

## 2022-05-25 NOTE — PROGRESS NOTES
[facility]   [facility]   [address]   [address]   [place] [address]   [phone] Fax: [phone]    Discharge Note        Wound location: LLW  Date of Discharge: 05/25/2022    Assessment:  Discharge patient at this time secondary to wound resolution. Patient daughter instructed how to put on Circaid compression garments. This RN placed Circaid on LLE, daughter placed Circaid compression garment on RLE while this RN supervised.   Patient instructed that they are discharged today. Instructed patient to keep area clean, it will be fragile for a few days, bathe and dry area gently, only ever regains a maximum of 80% of the tensile strength of the surrounding skin, remodeling of scar can continue for 6mo - a year. Contact PCP for a referral back to wound care if any problems with area opening and draining. Patient verbalizes understanding of all.

## 2022-05-25 NOTE — PROGRESS NOTES
Provider Encounter- Full Thickness wound    HISTORY OF PRESENT ILLNESS  Wound History:   START OF CARE IN CLINIC: 5/4/22              REFERRING PROVIDER: Dr. Yazmin Gusman    WOUND ETIOLOGY: Trauma              LOCATION: Left lower leg              HISTORY: 89 year old female presents with wound to left lower leg. Patient initially fell due to syncope on 12/21/21 and hit her leg on a piece of furniture. She went to the ED the next day for care; however it was determined not to suture the laceration due to possible infection and patient was advised to apply polysporin ointment. Patient follow-up with with her primary care doctor 1/19/22 which was noted wound healing normally & to allow more time to fully resolve. Patient followed up again 4/21/22 for her leg wound and was told to apply mupirocin ointment 2x/day; as well as a referral to [facility] was placed.       Pertinent Medical History: Obesity, edema of lower leg    TOBACCO USE:      Patient's problem list, allergies, and current medications reviewed and updated in Epic    Interval History:  5/11/2022 : Initial provider visit with TRACEE Karimi, ANA CRISTINA-BC, CWOMAIRAN, CFMAXIMUS.  Kayode states that she is feeling well.  She is tolerating compression without any difficulty.  The area of her wound has decreased since last assessment.  No evidence of infection today.    5/18/2022: Clinic visit with RTACEE Veroncia.  Jeans wound continues to progress overall she feels well denies fever chills.  Wound is more narrow by dimensions.  We will place an Unna boot with Coban for compression.  Discussed different compression stockings will measure the patient for CircAid compression stocking for the left lower extremity and placed an order with [employer].    5/25/2022: Clinic visit with TRACEE Veronica.  Wound has 100% epithelialized.  Patient educated on proper application of CircAid compression stockings.  Patient instructed to wear a cover dressing for approximately 2 weeks to  allow for full epithelialization and protect the newly epithelialized tissue from reopening.  Patient will be discharged from [facility] at this time.    REVIEW OF SYSTEMS:   Review of Systems   Constitutional: Negative for chills and fever.   Respiratory: Negative for cough and shortness of breath.    Cardiovascular: Positive for leg swelling. Negative for chest pain and claudication.        She has had mild swelling in her legs for some time   Gastrointestinal: Negative for constipation, diarrhea, nausea and vomiting.   Skin: Negative for rash.       PHYSICAL EXAMINATION:   BP (!) 154/65 (BP Location: Left arm, Patient Position: Sitting) Comment: RN notified  Pulse 69   Temp 36.2 °C (97.2 °F)   Resp 18   SpO2 94%     Physical Exam  Constitutional:       Appearance: She is obese.   HENT:      Head: Normocephalic.   Eyes:      Pupils: Pupils are equal, round, and reactive to light.   Cardiovascular:      Rate and Rhythm: Normal rate.      Comments: Pedal pulses palpable, 2+ bilaterally  Pulmonary:      Effort: Pulmonary effort is normal.   Musculoskeletal:      Right lower leg: Edema present.      Left lower leg: Edema present.      Comments: Plus edema bilateral lower extremities   Skin:     Comments: Full-thickness wound to left lateral lower leg resolved  Refer to wound flowsheet and photos   Neurological:      Mental Status: She is oriented to person, place, and time.   Psychiatric:         Mood and Affect: Mood normal.         Behavior: Behavior normal.         Thought Content: Thought content normal.         Judgment: Judgment normal.           PROCEDURE:   - no excisional debridement required in clinic today.  Patient's wound has 1% epithelialized patient be discharged to [facility] at this time.      Pertinent Labs and Diagnostics:    Labs:     A1c:   Lab Results   Component Value Date/Time    HBA1C 5.6 04/27/2017 10:08 AM          IMAGING: N/A    VASCULAR STUDIES: No recent studies found in epic    LAST  WOUND  CULTURE:  DATE : No results found for: CULTRSULT      ASSESSMENT AND PLAN:     1. Open wound of left lower leg, subsequent encounter  Comment: Traumatic injury.  Patient fell during episode of syncope, struck her leg on a piece of furniture in December 2021 5/25/2022: Wound has 100% epithelialized at this time.  -No excisional debridement required in clinic today.    -Patient discharged to [facility] at this time.  -Patient to keep wound covered with silicone adhesive foam for 1 to 2 weeks to help promote strength  Of the newly formed epithelial tissue.  -Educated on proper application of CircAid compression stockings in clinic today.      Wound care: Silicone adhesive foam, CircAid compression wraps    2.  Venous insufficiency    5/25/2022: Patient has generalized edema to bilateral extremities, hemosiderin staining, and skin changes indicative of CVI.  -CircAid compression stockings for lifelong compression  -Patient has been educated on other forms of compression including compression stockings informed her that she should maintain compression with approximately 30mmHg compression to help prevent future venous ulcers.        PATIENT EDUCATION  - Importance of adequate nutrition for wound healing  -Advised to go to ER for any increased redness, swelling, drainage, or odor, or if patient develops fever, chills, nausea or vomiting.     My total time spent caring for the patient on the day of the encounter was 20 minutes.   This does not include time spent on separately billable procedures/tests.       Please note that this note may have been created using voice recognition software. I have worked with technical experts from [employer] [employer] to optimize the interface.  I have made every reasonable attempt to correct obvious errors, but there may be errors of grammar and possibly content that I did not discover before finalizing the note.    N

## 2022-05-25 NOTE — PATIENT INSTRUCTIONS
-Avoid prolonged standing or sitting without elevating your legs.    -Remove your compression garments if you have severe pain, severe swelling, numbness, color change, or temperature change in your toes. If you need to remove your compression garments, do so by unrolling them. Do not cut the compression garments off, this is to prevent cutting yourself on accident.

## 2022-06-14 ENCOUNTER — TELEPHONE (OUTPATIENT)
Dept: HEALTH INFORMATION MANAGEMENT | Facility: OTHER | Age: 87
End: 2022-06-14
Payer: MEDICARE

## 2022-07-19 ENCOUNTER — OFFICE VISIT (OUTPATIENT)
Dept: MEDICAL GROUP | Facility: PHYSICIAN GROUP | Age: 87
End: 2022-07-19
Payer: MEDICARE

## 2022-07-19 ENCOUNTER — PATIENT OUTREACH (OUTPATIENT)
Dept: HEALTH INFORMATION MANAGEMENT | Facility: OTHER | Age: 87
End: 2022-07-19

## 2022-07-19 VITALS
SYSTOLIC BLOOD PRESSURE: 134 MMHG | TEMPERATURE: 98.2 F | WEIGHT: 212.2 LBS | DIASTOLIC BLOOD PRESSURE: 78 MMHG | BODY MASS INDEX: 34.1 KG/M2 | RESPIRATION RATE: 18 BRPM | HEIGHT: 66 IN | HEART RATE: 86 BPM | OXYGEN SATURATION: 96 %

## 2022-07-19 DIAGNOSIS — S81.802S WOUND OF LEFT LOWER EXTREMITY, SEQUELA: ICD-10-CM

## 2022-07-19 DIAGNOSIS — N18.31 STAGE 3A CHRONIC KIDNEY DISEASE: ICD-10-CM

## 2022-07-19 DIAGNOSIS — R26.89 BALANCE DISORDER: ICD-10-CM

## 2022-07-19 PROBLEM — S81.802A LEG WOUND, LEFT: Status: ACTIVE | Noted: 2022-07-19

## 2022-07-19 PROBLEM — S81.812A LEG LACERATION, LEFT, INITIAL ENCOUNTER: Status: RESOLVED | Noted: 2022-01-19 | Resolved: 2022-07-19

## 2022-07-19 PROCEDURE — 99213 OFFICE O/P EST LOW 20 MIN: CPT | Performed by: FAMILY MEDICINE

## 2022-07-19 PROCEDURE — 99439 CHRNC CARE MGMT STAF EA ADDL: CPT | Performed by: FAMILY MEDICINE

## 2022-07-19 PROCEDURE — 99490 CHRNC CARE MGMT STAFF 1ST 20: CPT | Performed by: FAMILY MEDICINE

## 2022-07-19 SDOH — ECONOMIC STABILITY: HOUSING INSECURITY: IN THE LAST 12 MONTHS, HOW MANY PLACES HAVE YOU LIVED?: 1

## 2022-07-19 SDOH — SOCIAL STABILITY: SOCIAL NETWORK: ARE YOU MARRIED, WIDOWED, DIVORCED, SEPARATED, NEVER MARRIED, OR LIVING WITH A PARTNER?: WIDOWED

## 2022-07-19 SDOH — HEALTH STABILITY: PHYSICAL HEALTH: ON AVERAGE, HOW MANY MINUTES DO YOU ENGAGE IN EXERCISE AT THIS LEVEL?: 30 MIN

## 2022-07-19 SDOH — ECONOMIC STABILITY: INCOME INSECURITY: IN THE LAST 12 MONTHS, WAS THERE A TIME WHEN YOU WERE NOT ABLE TO PAY THE MORTGAGE OR RENT ON TIME?: NO

## 2022-07-19 SDOH — HEALTH STABILITY: MENTAL HEALTH: HOW MANY STANDARD DRINKS CONTAINING ALCOHOL DO YOU HAVE ON A TYPICAL DAY?: 1 OR 2

## 2022-07-19 SDOH — HEALTH STABILITY: MENTAL HEALTH: HOW OFTEN DO YOU HAVE 6 OR MORE DRINKS ON ONE OCCASION?: NEVER

## 2022-07-19 SDOH — SOCIAL STABILITY: SOCIAL NETWORK: HOW OFTEN DO YOU GET TOGETHER WITH FRIENDS OR RELATIVES?: ONCE A WEEK

## 2022-07-19 SDOH — HEALTH STABILITY: MENTAL HEALTH
STRESS IS WHEN SOMEONE FEELS TENSE, NERVOUS, ANXIOUS, OR CAN'T SLEEP AT NIGHT BECAUSE THEIR MIND IS TROUBLED. HOW STRESSED ARE YOU?: NOT AT ALL

## 2022-07-19 SDOH — SOCIAL STABILITY: SOCIAL NETWORK
DO YOU BELONG TO ANY CLUBS OR ORGANIZATIONS SUCH AS CHURCH GROUPS UNIONS, FRATERNAL OR ATHLETIC GROUPS, OR SCHOOL GROUPS?: YES

## 2022-07-19 SDOH — ECONOMIC STABILITY: INCOME INSECURITY: HOW HARD IS IT FOR YOU TO PAY FOR THE VERY BASICS LIKE FOOD, HOUSING, MEDICAL CARE, AND HEATING?: NOT HARD AT ALL

## 2022-07-19 SDOH — ECONOMIC STABILITY: TRANSPORTATION INSECURITY
IN THE PAST 12 MONTHS, HAS THE LACK OF TRANSPORTATION KEPT YOU FROM MEDICAL APPOINTMENTS OR FROM GETTING MEDICATIONS?: NO

## 2022-07-19 SDOH — ECONOMIC STABILITY: TRANSPORTATION INSECURITY
IN THE PAST 12 MONTHS, HAS LACK OF TRANSPORTATION KEPT YOU FROM MEETINGS, WORK, OR FROM GETTING THINGS NEEDED FOR DAILY LIVING?: NO

## 2022-07-19 SDOH — SOCIAL STABILITY: SOCIAL NETWORK
IN A TYPICAL WEEK, HOW MANY TIMES DO YOU TALK ON THE PHONE WITH FAMILY, FRIENDS, OR NEIGHBORS?: MORE THAN THREE TIMES A WEEK

## 2022-07-19 SDOH — HEALTH STABILITY: MENTAL HEALTH: HOW OFTEN DO YOU HAVE A DRINK CONTAINING ALCOHOL?: MONTHLY OR LESS

## 2022-07-19 SDOH — HEALTH STABILITY: PHYSICAL HEALTH: ON AVERAGE, HOW MANY DAYS PER WEEK DO YOU ENGAGE IN MODERATE TO STRENUOUS EXERCISE (LIKE A BRISK WALK)?: 7 DAYS

## 2022-07-19 SDOH — ECONOMIC STABILITY: FOOD INSECURITY: WITHIN THE PAST 12 MONTHS, THE FOOD YOU BOUGHT JUST DIDN'T LAST AND YOU DIDN'T HAVE MONEY TO GET MORE.: NEVER TRUE

## 2022-07-19 SDOH — ECONOMIC STABILITY: FOOD INSECURITY: WITHIN THE PAST 12 MONTHS, YOU WORRIED THAT YOUR FOOD WOULD RUN OUT BEFORE YOU GOT MONEY TO BUY MORE.: NEVER TRUE

## 2022-07-19 SDOH — ECONOMIC STABILITY: HOUSING INSECURITY
IN THE LAST 12 MONTHS, WAS THERE A TIME WHEN YOU DID NOT HAVE A STEADY PLACE TO SLEEP OR SLEPT IN A SHELTER (INCLUDING NOW)?: NO

## 2022-07-19 SDOH — SOCIAL STABILITY: SOCIAL NETWORK: HOW OFTEN DO YOU ATTENT MEETINGS OF THE CLUB OR ORGANIZATION YOU BELONG TO?: NEVER

## 2022-07-19 SDOH — SOCIAL STABILITY: SOCIAL NETWORK: HOW OFTEN DO YOU ATTEND CHURCH OR RELIGIOUS SERVICES?: 1 TO 4 TIMES PER YEAR

## 2022-07-19 ASSESSMENT — LIFESTYLE VARIABLES
SKIP TO QUESTIONS 9-10: 1
AUDIT-C TOTAL SCORE: 1

## 2022-07-19 ASSESSMENT — FIBROSIS 4 INDEX: FIB4 SCORE: 2.23

## 2022-07-19 ASSESSMENT — PATIENT HEALTH QUESTIONNAIRE - PHQ9
CLINICAL INTERPRETATION OF PHQ2 SCORE: 1
SUM OF ALL RESPONSES TO PHQ QUESTIONS 1-9: 3
5. POOR APPETITE OR OVEREATING: 0 - NOT AT ALL

## 2022-07-19 NOTE — PROGRESS NOTES
Subjective:     CC: Recurrent left leg wound.    HPI:   Kayode presents today with the following medical concern:    Leg wound, left  This is a chronic problem.  Patient had been seen back in January and sent to wound care for treatment of the left leg wound.  By May had it healed over and was doing well.  She has been wearing compression stockings since then.  Then about 2 to 3 weeks ago it opened up again.  Does not recall any injury to it.  It is a little bit uncomfortable today.  There is been no drainage.  She does tend to sleep on her left side.    Balance disorder  This is a chronic problem.  Patient is worried that her mom has a little bit of problem with her balance at times.  She does have a cane that she does not like to use.  She has not fallen however.      Past Medical History:   Diagnosis Date   • Hip pain 1/22/2014   • Mild depression (HCC)        Social History     Tobacco Use   • Smoking status: Never Smoker   • Smokeless tobacco: Never Used   Vaping Use   • Vaping Use: Never used   Substance Use Topics   • Alcohol use: Yes     Alcohol/week: 0.0 oz     Comment: rare.    • Drug use: Never       Current Outpatient Medications Ordered in Epic   Medication Sig Dispense Refill   • vitamin D (D3-1000) 1000 UNIT Tab Take 2,000 Units by mouth every day.     • Ascorbic Acid (VITAMIN C) 1000 MG Tab Take  by mouth.     • therapeutic multivitamin-minerals (THERAGRAN-M) Tab Take 1 Tablet by mouth every day.       No current [facility]-ordered facility-administered medications on file.       Allergies:  Amoxicillin, Novocain, Other food, Bloodless, Peanut-derived, and Red dye #40 [fd&c red #40]    Health Maintenance: Completed    ROS:  Gen: no fevers/chills, no changes in weight  Eyes: no changes in vision  ENT: no sore throat, no hearing loss, no bloody nose  Pulm: no sob, no cough  CV: no chest pain, no palpitations  GI: no nausea/vomiting, no diarrhea  : no dysuria  MSk: no myalgias  Skin: no rash  Neuro: no  "headaches, no numbness/tingling  Heme/Lymph: no easy bruising      Objective:       Exam:  /78 (BP Location: Left arm, Patient Position: Sitting, BP Cuff Size: Adult)   Pulse 86   Temp 36.8 °C (98.2 °F) (Temporal)   Resp 18   Ht 1.676 m (5' 6\")   Wt 96.3 kg (212 lb 3.2 oz)   SpO2 96%   Breastfeeding No   BMI 34.25 kg/m²  Body mass index is 34.25 kg/m².    Gen: Alert and oriented, No apparent distress.  Ext: No clubbing, cyanosis, edema.  Examination of the patient's left lateral distal leg shows a recurrence of the open wound.  This time it has a odd clean shape to it.  It is oval measuring about 3 mm by 16 mm.  It is very clean.  Is just through the superficial layer.  There is a little bit of redness anteriorly but not induration.  No drainage is seen.            Assessment & Plan:     89 y.o. female with the following -     1. Balance disorder  This is a chronic problem.  Daughter was asking about referring her mother to physical therapy to get some balance training.  We will do that.  Encouraged her to use her cane for support as well.  - Referral to Physical Therapy    2. Wound of left lower extremity, sequela  This is a recurrent problem.  I am wondering if she is having undue pressure on that part of her leg when she is sleeping at night as she is tends to sleep on that side.  We will have her pay attention to her bed and perhaps keep it covered with the soft cushion during the night.  Continue to wear the compression dressings with a cushion over the wound as well.  If it is not healing in the next couple of weeks to let me know and we will send her back to wound care.    We also talked about COVID infection that is going around in her mother's risk for that.  Her other daughter has been keeping her pretty much quarantined at home and is a result this is causing some social issues.  I discussed with this daughter that it is okay to take her mother around using proper precautions.  Patient has " received her immunizations and booster.  Return if symptoms worsen or fail to improve.    Please note that this dictation was created using voice recognition software. I have made every reasonable attempt to correct obvious errors, but I expect that there are errors of grammar and possibly content that I did not discover before finalizing the note.

## 2022-07-19 NOTE — ASSESSMENT & PLAN NOTE
This is a chronic problem.  Patient had been seen back in January and sent to wound care for treatment of the left leg wound.  By May had it healed over and was doing well.  She has been wearing compression stockings since then.  Then about 2 to 3 weeks ago it opened up again.  Does not recall any injury to it.  It is a little bit uncomfortable today.  There is been no drainage.  She does tend to sleep on her left side.

## 2022-07-19 NOTE — ASSESSMENT & PLAN NOTE
This is a chronic problem.  Patient is worried that her mom has a little bit of problem with her balance at times.  She does have a cane that she does not like to use.  She has not fallen however.

## 2022-07-20 NOTE — PROGRESS NOTES
INITIAL CARE MANAGEMENT CARE PLAN/ASSESEMENT     Kayode is an 89 year old female in office today to see her PCP Dr. Gusman. She is in office with her daughter Terra who she would like to put on her file and has given permission to speak to and discuss medical information with. I introduced the personal care management program and CCM services and they would like to enroll today. Kayode verbalized her full name and  as well as intent to enroll for CCM program. She lives by herself in a single family home with only a few stairs into the garage and she has no problems navigating these. Her daughter and son in law live next door.  Her children are her support and both daughters help her when needed. She denies any financial or transportation needs at this time. However does have some functional needs they would like assistance with. Kayode has difficulty navigating over the large garden tube to shower and does need assistance with this as well as some safety rails in the bathroom to help her get up and down. She would also like some assistance with choosing appropriate diet choices of low sodium. She does occasionally cook however mostly eats pre cooked meals she can just warm up. We discussed healthy kidney diets choices and choosing foods low in sodium. She does try to walk 5-7 days a week a few blocks with her dog and she puts him in a stroller so that she has stability while walking. Kayode does have access to a walker if needed however, only uses it when she feels unstable and mostly at night. She would like some stability and mobility exercises to work on increasing strength. Informed Kayode and Terra I would have Rika our CHW reach out to them and see what resources she can provide and get them mailed out to her. Verified the address. Kayode and Terra gave me Terra's number and email as well. [phone] [email or web address]    Medication Self-Management Goals:     Reviewed medications listed below with  patient.      Current Outpatient Medications:   •  vitamin D (D3-1000) 1000 UNIT Tab, Take 2,000 Units by mouth every day., Disp: , Rfl:   •  Ascorbic Acid (VITAMIN C) 1000 MG Tab, Take  by mouth., Disp: , Rfl:   •  therapeutic multivitamin-minerals (THERAGRAN-M) Tab, Take 1 Tablet by mouth every day., Disp: , Rfl:          Goal:N/A patient not on any medication currently       Physical/Functional/Environmental Status:  Kayode uses a walker occasionally when she is out of the house and feels unsteady. However, for the most part she is able to get around without difficulty or any DME needs. She does walk her dog onelia stroller for stability when walking.         One or more falls in the last year: Yes  Advised to use a cane or walker to get around safely: No (Uses occasionally)  Feels unsteady when walking: No  Steadies self on furniture while walking at home: No  Worried about falling: No  Needs to push with hands when rising from a chair: No  Has trouble stepping up onto a curb / using stairs: No  Often has to rush to the toilet: No  Has lost some feeling in feet: No  Takes medicine that makes him/her feel lightheaded or more tired than usual: No  Takes medicine to sleep or improve mood: No  Often feels sad or depressed: Yes  STEADI Risk for Falling Score: 3       Goal: Use walker more consistently when out of the hosue     Financial Status:     Kayode denies any financial difficulties or resource needs at this time     Goal: N/A     Transportation Status:    Kayode currently denies transportation needs as she still drives and has her daughters who assist her with medical appointments    Goal: N/A    Mental/Behavioral/Psychosocial Status:    Depression Screen (PHQ-2/PHQ-9) 10/20/2021 1/19/2022 7/19/2022   PHQ-2 Total Score - - -   PHQ-2 Total Score - - -   PHQ-2 Total Score 0 0 1   PHQ-9 Total Score - - -   PHQ-9 Total Score - - 3       Interpretation of PHQ-9 Total Score   Score Severity   1-4 No Depression   5-9 Mild  Depression   10-14 Moderate Depression   15-19 Moderately Severe Depression   20-27 Severe Depression       Goal:  N/A      Chronic Care Management Care Plan       Nutrition    Goal:  Follow a kidney healthy diet plan, choose low sodium meals  Barriers: convenience, knowledge, age, mobility   Interventions: education, pre-made food resources, kidney friendly meal website    Start Date: 7/20/2022  End Date:     Fall Risk- Moderate Risk    Goal:  decrease fall risk  Barriers: knowledge, perceived ability greater than actual, age, stability  Interventions: education, motivation, stability exercises    Start Date: 7/19/2022  End Date:      Activity    Goal:  Increase activity level and stability and mobility   Barriers: knowledge, current mobility level, age, stability- needing DME when walking long distances  Interventions: Educations, exercise activities, motivation     Start Date: 7/19/2022  End Date:         Discussion: CCM program, goals, resources needs    Goals: Created     Next Scheduled patient outreach: 8/9/2022 @ 1pm

## 2022-07-25 ENCOUNTER — PATIENT OUTREACH (OUTPATIENT)
Dept: HEALTH INFORMATION MANAGEMENT | Facility: OTHER | Age: 87
End: 2022-07-25
Payer: MEDICARE

## 2022-07-25 DIAGNOSIS — N18.31 STAGE 3A CHRONIC KIDNEY DISEASE: ICD-10-CM

## 2022-07-25 NOTE — LETTER
July 25, 2022        Kayode Winters  [address]  [place] [address]        Dear Kayode:    My name is Rika and I am a Community Health Worker. I work with your care coordinator, Dominique HEAD RN. I have provided you with some light exercise worksheets that you can do at home, healthy meal planning, and information on places in our community that offer low-priced home modifications to prevent fall risk. Remember to be gentle with yourself when beginning these new exercises, and if anything hurts please stop and contact Dr. Gusman.     I will plan to follow up with you during your phone call with Dominique SOLANO on 8/9/22 at 1pm.     If you have any questions or concerns in the mean time, please call!         Sincerely,        Rika Linares  Community Health Worker   [phone]

## 2022-07-25 NOTE — PROGRESS NOTES
7/25/22  CCM Community Health Worker Intake    • Social determinates of health intake: Completed   • Identified barriers to: Home safety/ fall prevention.   • Contact information provided to Kayode Winters.  • Next  PCP appointment scheduled for 1/3/2023  • Scheduled CHW Follow-Up: 8/9/22 @1300   o Type of Follow-Up: Phone   • Case Management General Assessment in Epic: Completed   • Review of care plan goals: Yes    Action Plan:  CHW mailed patient Kayode stability/ mobility easy exercise worksheets, healthy meal planning/ tips/ recipes, and community senior fall prevention programs/ home modifications. CHW will f/u with Kayode during RN outreach on 8/9/22 @1300.

## 2022-08-09 ENCOUNTER — PATIENT OUTREACH (OUTPATIENT)
Dept: HEALTH INFORMATION MANAGEMENT | Facility: OTHER | Age: 87
End: 2022-08-09
Payer: MEDICARE

## 2022-08-09 DIAGNOSIS — N18.31 STAGE 3A CHRONIC KIDNEY DISEASE: ICD-10-CM

## 2022-08-09 NOTE — PROGRESS NOTES
SUDHA with contact information for Kayode to call back for CCM follow-up and follow-up on resources. If no call back will attempt to call again.

## 2022-08-22 NOTE — PROGRESS NOTES
Assessment    Spoke with Estelle today as I have been unable to get a hold of Kayode for CCM follow-up. Estelle said she apologized that her mom wasn't calling back but she is doing ok and they did get the resources that iRka sent out. She said they did not get any information for help on the bathtub modification that she could remember. Informed her that I  will have Rika send what information we had out to them. Estelle also said that the cut on Kayode's leg reopened and is not healing well. Tried making an appointment for PCP Dr. Joby Gusman but she said she will do it on [facility] because she will need to check Kayode's schedule.     Education    Exercise, safety, diet    Care Plan    Progressing    Progress:    progressing    Next outreach: 9/15/2022 @ [id number]

## 2022-08-25 ENCOUNTER — PATIENT OUTREACH (OUTPATIENT)
Dept: HEALTH INFORMATION MANAGEMENT | Facility: OTHER | Age: 87
End: 2022-08-25
Payer: MEDICARE

## 2022-08-25 DIAGNOSIS — N18.31 STAGE 3A CHRONIC KIDNEY DISEASE: ICD-10-CM

## 2022-09-15 ENCOUNTER — PATIENT OUTREACH (OUTPATIENT)
Dept: HEALTH INFORMATION MANAGEMENT | Facility: OTHER | Age: 87
End: 2022-09-15
Payer: MEDICARE

## 2022-09-15 DIAGNOSIS — N18.31 STAGE 3A CHRONIC KIDNEY DISEASE: ICD-10-CM

## 2022-09-20 ENCOUNTER — PATIENT MESSAGE (OUTPATIENT)
Dept: HEALTH INFORMATION MANAGEMENT | Facility: OTHER | Age: 87
End: 2022-09-20

## 2022-09-20 NOTE — PROGRESS NOTES
Attempt # 2 for CCM Follow-up. LVM with contact information for call back as well as sent a MyChart for a call request.

## 2022-10-10 ENCOUNTER — PATIENT OUTREACH (OUTPATIENT)
Dept: HEALTH INFORMATION MANAGEMENT | Facility: OTHER | Age: 87
End: 2022-10-10
Payer: MEDICARE

## 2022-10-10 DIAGNOSIS — N18.31 STAGE 3A CHRONIC KIDNEY DISEASE: ICD-10-CM

## 2022-10-10 PROCEDURE — 99490 CHRNC CARE MGMT STAFF 1ST 20: CPT | Performed by: FAMILY MEDICINE

## 2022-10-12 NOTE — PROGRESS NOTES
Assessment    Unable to reach Kayode on multiple occasions as her phone goes straight to [facility] and she does not return calls or respond to [employer] messages. Spoke with her daughter Estelle today for follow-up and she is requesting her to be discharged off program as she doesn't feel her mom wants to be involved and they have not follow-up with the resources given. She was given my phone number for future use if she needs to use it or any needs arise. Will discharge patient off program.     Education    Discussed Personal care management and CCM services    Care Plan    Progressing    Progress:    Progressing     Next outreach: N/A

## 2022-11-16 ENCOUNTER — APPOINTMENT (OUTPATIENT)
Dept: RADIOLOGY | Facility: MEDICAL CENTER | Age: 87
End: 2022-11-16
Attending: EMERGENCY MEDICINE
Payer: MEDICARE

## 2022-11-16 ENCOUNTER — HOSPITAL ENCOUNTER (EMERGENCY)
Facility: MEDICAL CENTER | Age: 87
End: 2022-11-16
Attending: EMERGENCY MEDICINE
Payer: MEDICARE

## 2022-11-16 VITALS
TEMPERATURE: 97.5 F | SYSTOLIC BLOOD PRESSURE: 134 MMHG | WEIGHT: 210 LBS | RESPIRATION RATE: 18 BRPM | HEART RATE: 82 BPM | BODY MASS INDEX: 34.99 KG/M2 | HEIGHT: 65 IN | OXYGEN SATURATION: 94 % | DIASTOLIC BLOOD PRESSURE: 58 MMHG
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
## 2022-11-16 DIAGNOSIS — M25.552 LEFT HIP PAIN: ICD-10-CM

## 2022-11-16 DIAGNOSIS — S70.02XA CONTUSION OF LEFT HIP, INITIAL ENCOUNTER: ICD-10-CM

## 2022-11-16 DIAGNOSIS — S29.019A THORACIC MYOFASCIAL STRAIN, INITIAL ENCOUNTER: ICD-10-CM

## 2022-11-16 LAB
ALBUMIN SERPL BCP-MCNC: 4.3 G/DL (ref 3.2–4.9)
ALBUMIN/GLOB SERPL: 1.3 G/DL
ALP SERPL-CCNC: 68 U/L (ref 30–99)
ALT SERPL-CCNC: 31 U/L (ref 2–50)
ANION GAP SERPL CALC-SCNC: 14 MMOL/L (ref 7–16)
APPEARANCE UR: CLEAR
APTT PPP: 29.2 SEC (ref 24.7–36)
AST SERPL-CCNC: 50 U/L (ref 12–45)
BACTERIA #/AREA URNS HPF: ABNORMAL /HPF
BASOPHILS # BLD AUTO: 0.3 % (ref 0–1.8)
BASOPHILS # BLD: 0.04 K/UL (ref 0–0.12)
BILIRUB SERPL-MCNC: 1 MG/DL (ref 0.1–1.5)
BILIRUB UR QL STRIP.AUTO: NEGATIVE
BUN SERPL-MCNC: 22 MG/DL (ref 8–22)
CALCIUM SERPL-MCNC: 9.8 MG/DL (ref 8.4–10.2)
CHLORIDE SERPL-SCNC: 102 MMOL/L (ref 96–112)
CK SERPL-CCNC: 1318 U/L (ref 0–154)
CO2 SERPL-SCNC: 24 MMOL/L (ref 20–33)
COLOR UR: YELLOW
CREAT SERPL-MCNC: 0.91 MG/DL (ref 0.5–1.4)
EKG IMPRESSION: NORMAL
EOSINOPHIL # BLD AUTO: 0.02 K/UL (ref 0–0.51)
EOSINOPHIL NFR BLD: 0.1 % (ref 0–6.9)
EPI CELLS #/AREA URNS HPF: ABNORMAL /HPF
ERYTHROCYTE [DISTWIDTH] IN BLOOD BY AUTOMATED COUNT: 47.3 FL (ref 35.9–50)
GFR SERPLBLD CREATININE-BSD FMLA CKD-EPI: 60 ML/MIN/1.73 M 2
GLOBULIN SER CALC-MCNC: 3.4 G/DL (ref 1.9–3.5)
GLUCOSE SERPL-MCNC: 145 MG/DL (ref 65–99)
GLUCOSE UR STRIP.AUTO-MCNC: NEGATIVE MG/DL
HCT VFR BLD AUTO: 46.9 % (ref 37–47)
HGB BLD-MCNC: 15.8 G/DL (ref 12–16)
IMM GRANULOCYTES # BLD AUTO: 0.07 K/UL (ref 0–0.11)
IMM GRANULOCYTES NFR BLD AUTO: 0.5 % (ref 0–0.9)
INR PPP: 1.11 (ref 0.87–1.13)
KETONES UR STRIP.AUTO-MCNC: NEGATIVE MG/DL
LEUKOCYTE ESTERASE UR QL STRIP.AUTO: NEGATIVE
LYMPHOCYTES # BLD AUTO: 2.29 K/UL (ref 1–4.8)
LYMPHOCYTES NFR BLD: 15.6 % (ref 22–41)
MCH RBC QN AUTO: 32.5 PG (ref 27–33)
MCHC RBC AUTO-ENTMCNC: 33.7 G/DL (ref 33.6–35)
MCV RBC AUTO: 96.5 FL (ref 81.4–97.8)
MICRO URNS: ABNORMAL
MONOCYTES # BLD AUTO: 0.91 K/UL (ref 0–0.85)
MONOCYTES NFR BLD AUTO: 6.2 % (ref 0–13.4)
MUCOUS THREADS #/AREA URNS HPF: ABNORMAL /HPF
NEUTROPHILS # BLD AUTO: 11.35 K/UL (ref 2–7.15)
NEUTROPHILS NFR BLD: 77.3 % (ref 44–72)
NITRITE UR QL STRIP.AUTO: NEGATIVE
NRBC # BLD AUTO: 0 K/UL
NRBC BLD-RTO: 0 /100 WBC
PH UR STRIP.AUTO: 5.5 [PH] (ref 5–8)
PLATELET # BLD AUTO: 252 K/UL (ref 164–446)
PMV BLD AUTO: 9.5 FL (ref 9–12.9)
POTASSIUM SERPL-SCNC: 5 MMOL/L (ref 3.6–5.5)
PROT SERPL-MCNC: 7.7 G/DL (ref 6–8.2)
PROT UR QL STRIP: 30 MG/DL
PROTHROMBIN TIME: 13.9 SEC (ref 12–14.6)
RBC # BLD AUTO: 4.86 M/UL (ref 4.2–5.4)
RBC # URNS HPF: ABNORMAL /HPF
RBC UR QL AUTO: NEGATIVE
SODIUM SERPL-SCNC: 140 MMOL/L (ref 135–145)
SP GR UR STRIP.AUTO: >=1.03
TROPONIN T SERPL-MCNC: 18 NG/L (ref 6–19)
WBC # BLD AUTO: 14.7 K/UL (ref 4.8–10.8)
WBC #/AREA URNS HPF: ABNORMAL /HPF

## 2022-11-16 PROCEDURE — 72128 CT CHEST SPINE W/O DYE: CPT

## 2022-11-16 PROCEDURE — 82550 ASSAY OF CK (CPK): CPT

## 2022-11-16 PROCEDURE — 71045 X-RAY EXAM CHEST 1 VIEW: CPT

## 2022-11-16 PROCEDURE — 81001 URINALYSIS AUTO W/SCOPE: CPT

## 2022-11-16 PROCEDURE — 85025 COMPLETE CBC W/AUTO DIFF WBC: CPT

## 2022-11-16 PROCEDURE — 700105 HCHG RX REV CODE 258: Performed by: EMERGENCY MEDICINE

## 2022-11-16 PROCEDURE — 94760 N-INVAS EAR/PLS OXIMETRY 1: CPT

## 2022-11-16 PROCEDURE — 93005 ELECTROCARDIOGRAM TRACING: CPT | Performed by: EMERGENCY MEDICINE

## 2022-11-16 PROCEDURE — 84484 ASSAY OF TROPONIN QUANT: CPT

## 2022-11-16 PROCEDURE — 96375 TX/PRO/DX INJ NEW DRUG ADDON: CPT

## 2022-11-16 PROCEDURE — 99285 EMERGENCY DEPT VISIT HI MDM: CPT

## 2022-11-16 PROCEDURE — 80053 COMPREHEN METABOLIC PANEL: CPT

## 2022-11-16 PROCEDURE — 73501 X-RAY EXAM HIP UNI 1 VIEW: CPT | Mod: LT

## 2022-11-16 PROCEDURE — 85610 PROTHROMBIN TIME: CPT

## 2022-11-16 PROCEDURE — 85730 THROMBOPLASTIN TIME PARTIAL: CPT

## 2022-11-16 PROCEDURE — 700111 HCHG RX REV CODE 636 W/ 250 OVERRIDE (IP): Performed by: EMERGENCY MEDICINE

## 2022-11-16 PROCEDURE — 96374 THER/PROPH/DIAG INJ IV PUSH: CPT

## 2022-11-16 PROCEDURE — 36415 COLL VENOUS BLD VENIPUNCTURE: CPT

## 2022-11-16 RX ORDER — ONDANSETRON 2 MG/ML
4 INJECTION INTRAMUSCULAR; INTRAVENOUS ONCE
Status: COMPLETED | OUTPATIENT
Start: 2022-11-16 | End: 2022-11-16

## 2022-11-16 RX ORDER — MORPHINE SULFATE 4 MG/ML
4 INJECTION INTRAVENOUS ONCE
Status: COMPLETED | OUTPATIENT
Start: 2022-11-16 | End: 2022-11-16

## 2022-11-16 RX ORDER — SODIUM CHLORIDE 9 MG/ML
INJECTION, SOLUTION INTRAVENOUS CONTINUOUS
Status: DISCONTINUED | OUTPATIENT
Start: 2022-11-16 | End: 2022-11-17 | Stop reason: HOSPADM

## 2022-11-16 RX ADMIN — SODIUM CHLORIDE: 9 INJECTION, SOLUTION INTRAVENOUS at 20:57

## 2022-11-16 RX ADMIN — ONDANSETRON 4 MG: 2 INJECTION INTRAMUSCULAR; INTRAVENOUS at 20:57

## 2022-11-16 RX ADMIN — MORPHINE SULFATE 4 MG: 4 INJECTION INTRAVENOUS at 20:56

## 2022-11-16 ASSESSMENT — LIFESTYLE VARIABLES
DO YOU DRINK ALCOHOL: NO
CONSUMPTION TOTAL: INCOMPLETE
EVER HAD A DRINK FIRST THING IN THE MORNING TO STEADY YOUR NERVES TO GET RID OF A HANGOVER: NO
HAVE YOU EVER FELT YOU SHOULD CUT DOWN ON YOUR DRINKING: NO
TOTAL SCORE: 0
EVER FELT BAD OR GUILTY ABOUT YOUR DRINKING: NO
HAVE PEOPLE ANNOYED YOU BY CRITICIZING YOUR DRINKING: NO

## 2022-11-16 ASSESSMENT — FIBROSIS 4 INDEX: FIB4 SCORE: 2.23

## 2022-11-17 NOTE — ED NOTES
Pt d/c home w/ daughter.  She passes ambs trial w/ RW and home w/ RW.  VSS.  Denies any distress.  Will f/u w/ PCP as discussed and will return to the ED for new or concerning sx as discussed.

## 2022-11-17 NOTE — ED PROVIDER NOTES
ED Provider Note     Scribed for Michaela Luis D.O. by Abdirahman Ryan. 11/16/2022, 7:46 PM.     Primary care provider: Joby Gusman III, M.D.  Means of arrival: Walk-in         History obtained from: Patient  History limited by: None    CHIEF COMPLAINT  Chief Complaint   Patient presents with    Fall     Of unknown time. PT needed to be assisted by fire to get up and into car as pt did not want to go with fire crew. pts story has changed multiple times when trying to get a story of how or when she fell       Hip Pain     Left hip pain since fall     Back Pain     Mid back       HPI  Kayode Winters is a 89 y.o. female who presents to the emergency Department for evaluation following a ground level fall onset prior to arrival. The patient says she was taking off her clothes to take a bath when she suddenly felt dizzy and fell onto vinyl floor in her bedroom. Per daughter, she is insure if the patient was down all night because she found her lying on the floor next to the bed with a pillow under her head. She additionally notes the patient's story does not make sense to her. Daughter says the paramedics told her that they smelled urine on the patient's clothes. She has associated symptoms of left hip pain, and back pain, but denies loss of consciousness or neck pain. No alleviating factors reported.    REVIEW OF SYSTEMS  Pertinent positives include ground level fall, left hip pain, and back pain, . Pertinent negatives include no loss of consciousness or neck pain.   See HPI for further details. All other systems are negative.    PAST MEDICAL HISTORY  Past Medical History:   Diagnosis Date    Hip pain 1/22/2014    Mild depression        FAMILY HISTORY  Family History   Problem Relation Age of Onset    Depression Sister     Genetic Disorder Neg Hx        SOCIAL HISTORY  Social History     Tobacco Use    Smoking status: Never    Smokeless tobacco: Never   Vaping Use    Vaping Use: Never used   Substance Use Topics     "Alcohol use: Yes     Alcohol/week: 0.0 oz     Comment: rare.     Drug use: Never      Social History     Substance and Sexual Activity   Drug Use Never       SURGICAL HISTORY  Past Surgical History:   Procedure Laterality Date    APPENDECTOMY         CURRENT MEDICATIONS  Current Outpatient Medications   Medication Instructions    Ascorbic Acid (VITAMIN C) 1000 MG Tab Oral    D3-1000 2,000 Units, Oral, DAILY    therapeutic multivitamin-minerals (THERAGRAN-M) Tab 1 Tablet, Oral, DAILY     ALLERGIES  Allergies   Allergen Reactions    Amoxicillin Unspecified     Per pt states to getting extreme weakness and pt could not move much, this then got followed by body aches    Novocain Contact Dermatitis    Other Food Contact Dermatitis     Pt is allergic to peanuts and peanut products.    Bloodless     Peanut-Derived     Red Dye #40 [Fd&C Red #40]        PHYSICAL EXAM  VITAL SIGNS: BP (!) 141/83   Pulse 98   Temp 36.3 °C (97.3 °F) (Temporal)   Resp 20   Ht 1.651 m (5' 5\")   Wt 95.3 kg (210 lb)   SpO2 94%   BMI 34.95 kg/m²     Constitutional: Patient is morbidly obese. Non-toxic appearing. Moderate distress with any type of hip movement.   Neck: Nontender midline cervical spine with full range of motion.  Cardiovascular: Normal heart rate and Regular rhythm. No murmur.  Thorax & Lungs: Clear and equal breath sounds with good excursion. No respiratory distress, no rhonchi, wheezing or rales.  Abdomen: Bowel sounds normal in all four quadrants. Soft, obese, nontender, no rebound , guarding, palpable masses.   Skin: Warm, Dry, No contusions or abrasions.  Back: Tenderness in mid thoracic spine, No bony step offs or deformities.  Extremities: Peripheral pulses 4/4 No edema, Left hip tenderness with limited range of motion secondary to pain  Musculoskeletal: Left hip tenderness with limited range of motion secondary to pain  Neurologic: Alert & oriented x 3, Normal motor function, Normal sensory function.  Psychiatric: " Affect normal, Judgment normal, Mood normal.     DIAGNOSTICS/PROCEDURES    LABS  Results for orders placed or performed during the hospital encounter of 11/16/22   CBC WITH DIFFERENTIAL   Result Value Ref Range    WBC 14.7 (H) 4.8 - 10.8 K/uL    RBC 4.86 4.20 - 5.40 M/uL    Hemoglobin 15.8 12.0 - 16.0 g/dL    Hematocrit 46.9 37.0 - 47.0 %    MCV 96.5 81.4 - 97.8 fL    MCH 32.5 27.0 - 33.0 pg    MCHC 33.7 33.6 - 35.0 g/dL    RDW 47.3 35.9 - 50.0 fL    Platelet Count 252 164 - 446 K/uL    MPV 9.5 9.0 - 12.9 fL    Neutrophils-Polys 77.30 (H) 44.00 - 72.00 %    Lymphocytes 15.60 (L) 22.00 - 41.00 %    Monocytes 6.20 0.00 - 13.40 %    Eosinophils 0.10 0.00 - 6.90 %    Basophils 0.30 0.00 - 1.80 %    Immature Granulocytes 0.50 0.00 - 0.90 %    Nucleated RBC 0.00 /100 WBC    Neutrophils (Absolute) 11.35 (H) 2.00 - 7.15 K/uL    Lymphs (Absolute) 2.29 1.00 - 4.80 K/uL    Monos (Absolute) 0.91 (H) 0.00 - 0.85 K/uL    Eos (Absolute) 0.02 0.00 - 0.51 K/uL    Baso (Absolute) 0.04 0.00 - 0.12 K/uL    Immature Granulocytes (abs) 0.07 0.00 - 0.11 K/uL    NRBC (Absolute) 0.00 K/uL   COMP METABOLIC PANEL   Result Value Ref Range    Sodium 140 135 - 145 mmol/L    Potassium 5.0 3.6 - 5.5 mmol/L    Chloride 102 96 - 112 mmol/L    Co2 24 20 - 33 mmol/L    Anion Gap 14.0 7.0 - 16.0    Glucose 145 (H) 65 - 99 mg/dL    Bun 22 8 - 22 mg/dL    Creatinine 0.91 0.50 - 1.40 mg/dL    Calcium 9.8 8.4 - 10.2 mg/dL    AST(SGOT) 50 (H) 12 - 45 U/L    ALT(SGPT) 31 2 - 50 U/L    Alkaline Phosphatase 68 30 - 99 U/L    Total Bilirubin 1.0 0.1 - 1.5 mg/dL    Albumin 4.3 3.2 - 4.9 g/dL    Total Protein 7.7 6.0 - 8.2 g/dL    Globulin 3.4 1.9 - 3.5 g/dL    A-G Ratio 1.3 g/dL   TROPONIN   Result Value Ref Range    Troponin T 18 6 - 19 ng/L   URINALYSIS CULTURE, IF INDICATED    Specimen: Urine, [place] Cath   Result Value Ref Range    Color Yellow     Character Clear     Specific Gravity >=1.030 <1.035    Ph 5.5 5.0 - 8.0    Glucose Negative Negative mg/dL     Ketones Negative Negative mg/dL    Protein 30 (A) Negative mg/dL    Bilirubin Negative Negative    Nitrite Negative Negative    Leukocyte Esterase Negative Negative    Occult Blood Negative Negative    Micro Urine Req Microscopic    APTT   Result Value Ref Range    APTT 29.2 24.7 - 36.0 sec   PROTHROMBIN TIME (INR)   Result Value Ref Range    PT 13.9 12.0 - 14.6 sec    INR 1.11 0.87 - 1.13   CREATINE KINASE   Result Value Ref Range    CPK Total 1318 (H) 0 - 154 U/L   ESTIMATED GFR   Result Value Ref Range    GFR (CKD-EPI) 60 >60 mL/min/1.73 m 2   URINE MICROSCOPIC (W/UA)   Result Value Ref Range    WBC 0-2 /hpf    RBC 0-2 /hpf    Bacteria Few (A) None /hpf    Epithelial Cells Rare Few /hpf    Mucous Threads Many /hpf   EKG   Result Value Ref Range    Report       [facility] Emergency Dept.    Test Date:  2022  Pt Name:    KRISHNA HUERTA                   Department: [facility]  MRN:        [id number]                      [facility]  Gender:     Female                       Technician: [id number]  :        [id number]                   Requested By:CARMEN ORTA  Order #:    [id number]                    Reading MD:    Measurements  Intervals                                Axis  Rate:       87                           P:          50  HI:         164                          QRS:        19  QRSD:       74                           T:          6  QT:         356  QTc:        429    Interpretive Statements  SINUS RHYTHM  Compared to ECG 2021 21:32:19  No significant changes         Labs reviewed by me    EKG  12 Lead EKG interpreted by me as shown above.    RADIOLOGY/PROCEDURES  DX-CHEST-PORTABLE (1 VIEW)   Final Result      Perihilar and bibasilar underinflation atelectasis      DX-HIP-UNILATERAL-WITH PELVIS-1 VIEW LEFT   Final Result      1.  No radiographic evidence of acute traumatic injury.   2.  Osteoarthritis      CT-TSPINE W/O PLUS RECONS   Final Result      No acute fracture or  gross malalignment        Results and radiologist interpretation reviewed by me.     COURSE & MEDICAL DECISION MAKING  Pertinent Labs & Imaging studies reviewed. (See chart for details)    7:46 PM - Patient seen and evaluated at bedside. Discussed plan of care, including lab work and radiology. Patient and daughter agree to plan of care. Ordered for Creatine Kinase, DX-Chest, CT-Tspine w/o, DX-Hip Unilateral w/ Pelvis, CBC w/ Diff, CMP, Troponin, UA Culture, APTT, Prothrombin Time, and EKG to evaluate. The patient will be treated with morphine 4 mg injection, Zofran 4 mg injection, and IV Fluids for her symptoms. Differential diagnoses include, but are not limited to, Hip Fracture vs Spine Fracture vs UTI vs Dehydration  X-rays were all noted to be negative for fracture.  Her laboratories show a slightly elevated white blood cell count of 14.7 with a stable H&H.  Urinalysis was completely negative.  Her CPK was elevated at 1318 likely due to her traumatic fall.  I do not think she is in rhabdomyolysis at this time because her urinalysis was clear with no red blood cells and there are no other abnormalities.  Patient is markedly improved.  I have given her a liter of IV fluids, we road tested her to see if she could ambulate and she seems able to manage at home.  Her daughter feels comfortable taking her home and we will offer her a walker so that she does not use a cane with the chances of falling again.  They are to follow-up with her primary care provider for recheck and return if any problems or worsening.  She is discharged in stable and improved condition.    HYDRATION: Based on the patient's presentation of Dehydration the patient was given IV fluids. IV Hydration was used because oral hydration was not adequate alone. Upon recheck following hydration, the patient was improved.      FINAL IMPRESSION  1. Fall, initial encounter    2. Left hip pain    3. Contusion of left hip, initial encounter    4. Thoracic  myofascial strain, initial encounter         IAbdirahman (Scribe), am scribing for, and in the presence of, Michaela Luis D.O..    Electronically signed by: Abdirahman Ryan (Scribe), 11/16/2022    IMichaela D.O. personally performed the services described in this documentation, as scribed by Abdirahman Ryan in my presence, and it is both accurate and complete.    The note accurately reflects work and decisions made by me.  Michaela Luis D.O.  11/16/2022  11:26 PM

## 2022-11-17 NOTE — ED TRIAGE NOTES
"Chief Complaint   Patient presents with    Fall     Of unknown time. PT needed to be assisted by fire to get up and into car as pt did not want to go with fire crew. pts story has changed multiple times when trying to get a story of how or when she fell       Hip Pain     Left hip pain since fall     Back Pain     Mid back       Pulse 98   Temp 36.3 °C (97.3 °F) (Temporal)   Resp 20   Ht 1.651 m (5' 5\")   Wt 95.3 kg (210 lb)   BMI 34.95 kg/m²     1824 Charge aware of pt.   "

## 2022-11-17 NOTE — DISCHARGE INSTRUCTIONS
Start using a walker at all times to ambulate as this will be much more stable for balance.    If you start to feel dizzy please sit down.  Make sure you are drinking plenty of water every day at least eight 8 ounce glasses.  Follow-up with your primary care provider within the week for recheck and return if any problems or worsening.  Tylenol or ibuprofen for pain.

## 2022-11-21 ENCOUNTER — OFFICE VISIT (OUTPATIENT)
Dept: MEDICAL GROUP | Facility: PHYSICIAN GROUP | Age: 87
End: 2022-11-21
Payer: MEDICARE

## 2022-11-21 VITALS
RESPIRATION RATE: 20 BRPM | HEIGHT: 65 IN | OXYGEN SATURATION: 95 % | WEIGHT: 214.31 LBS | TEMPERATURE: 98.1 F | DIASTOLIC BLOOD PRESSURE: 70 MMHG | BODY MASS INDEX: 35.71 KG/M2 | SYSTOLIC BLOOD PRESSURE: 140 MMHG | HEART RATE: 86 BPM
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
## 2022-11-21 PROCEDURE — 99214 OFFICE O/P EST MOD 30 MIN: CPT | Performed by: FAMILY MEDICINE

## 2022-11-21 ASSESSMENT — FIBROSIS 4 INDEX: FIB4 SCORE: 3.17

## 2022-11-21 NOTE — LETTER
[address] [address]     November 21, 2022    Patient: Kayode Winters   YOB: 1933   Date of Visit: 11/21/2022       To Whom It May Concern:    Kayode Winters was seen and treated in our department on 11/21/2022. She would benefit with a medical alert device for falls or emergencies.    Sincerely,     Joby Gusman III, M.D.

## 2022-11-22 NOTE — ASSESSMENT & PLAN NOTE
This is a new problem.  Patient had a fall in her bathroom last week on the 16th.  She does not recall exactly what happened.  She does know that she was getting out of the bathtub and was on the ground when she was found.  She does not know how long she had been there.  She was apparently very confused for a certain amount of time.  In the emergency room evaluation was negative except for dehydration.  She did have a bad bruise to her right upper subscapular area that is now getting better.  She states she is not needing any pain medicine for it.  Although she has had some balance issues in the past she has not had a fall like this before.    Since then she is trying to work on her hydration.  And she is using a walker to help with her balance.  She is accompanied today by her son-in-law.

## 2022-11-22 NOTE — PROGRESS NOTES
Subjective:     CC: Here for follow-up on emergency room visit for a fall.    HPI:   Kayode presents today with the following medical concerns:    Fall at home  This is a new problem.  Patient had a fall in her bathroom last week on the 16th.  She does not recall exactly what happened.  She does know that she was getting out of the bathtub and was on the ground when she was found.  She does not know how long she had been there.  She was apparently very confused for a certain amount of time.  In the emergency room evaluation was negative except for dehydration.  She did have a bad bruise to her right upper subscapular area that is now getting better.  She states she is not needing any pain medicine for it.  Although she has had some balance issues in the past she has not had a fall like this before.    Since then she is trying to work on her hydration.  And she is using a walker to help with her balance.  She is accompanied today by her son-in-law.    Past Medical History:   Diagnosis Date    Hip pain 1/22/2014    Mild depression        Social History     Tobacco Use    Smoking status: Never    Smokeless tobacco: Never   Vaping Use    Vaping Use: Never used   Substance Use Topics    Alcohol use: Yes     Alcohol/week: 0.0 oz     Comment: rare.     Drug use: Never       Current Outpatient Medications Ordered in Epic   Medication Sig Dispense Refill    vitamin D (D3-1000) 1000 UNIT Tab Take 2 Tablets by mouth every day.      Ascorbic Acid (VITAMIN C) 1000 MG Tab Take  by mouth.      therapeutic multivitamin-minerals (THERAGRAN-M) Tab Take 1 Tablet by mouth every day.       No current [facility]-ordered facility-administered medications on file.       Allergies:  Amoxicillin, Novocain, Other food, Bloodless, Peanut-derived, and Red dye #40 [fd&c red #40]    Health Maintenance:done    ROS:  Gen: no fevers/chills, no changes in weight  Eyes: no changes in vision  ENT: no sore throat, no hearing loss, no bloody nose  Pulm: no sob,  "no cough  CV: no chest pain, no palpitations  GI: no nausea/vomiting, no diarrhea  : no dysuria  MSk: no myalgias  Skin: no rash  Neuro: no headaches, no numbness/tingling  Heme/Lymph: no easy bruising      Objective:       Exam:  BP (!) 140/70 (BP Location: Left arm, Patient Position: Sitting, BP Cuff Size: Adult)   Pulse 86   Temp 36.7 °C (98.1 °F) (Temporal)   Resp 20   Ht 1.651 m (5' 5\")   Wt 97.2 kg (214 lb 5 oz)   SpO2 95%   Breastfeeding No   BMI 35.66 kg/m²  Body mass index is 35.66 kg/m².    Gen: Alert and oriented, No apparent distress.  Neck: Neck is supple without lymphadenopathy.  Lungs: Normal effort, CTA bilaterally, no wheezes, rhonchi, or rales  CV: Regular rate and rhythm. No murmurs, rubs, or gallops.  No carotid bruits.  Back:   Patient has some swelling to her right mid scapula consistent with a contusion.  Ext: No clubbing, cyanosis, edema.  Neuro: Cranial nerves II through VIII are grossly intact.  No lateralized signs are seen.      Labs: Labs from the emergency room reviewed    Assessment & Plan:     89 y.o. female with the following -     1. Fall in home, initial encounter  This is an acute problem.  Patient has not had a fall like this before in the past.  I told her it may be a combination of dehydration and getting out of the bathtub then slipping and falling.  She may have also hit her head which caused the confusion along with some dehydration.  For now she is going to be very conscious about staying hydrated and use her walker when needed.  Her family is getting a bath chair for her to use and someone will be there to assist her as well.  They were advised if she has another episode of fainting or falling to let me know as further evaluation will need to be done.    Also gave her a letter that a home medical alert device would be very helpful for her.  If they need another form to let me know.  Also filled out a handicap parking form for her.    [facility] Form    Diagnosis: " E66.01 - Morbid (severe) obesity due to excess calories (HCC)  Z68.35 - Body mass index (BMI) 35.0-35.9, adult  Comorbidity Diagnosis: Atherosclerosis of aorta (HCC)  The current BMI is 35.66 kg/m2 as of 11/21/22 16:29 PST  Assessment and plan: Chronic, stable. Encouraged healthy diet and physical activity changes with a goal of weight loss. Follow up at least annually. The comorbid condition is asymptomatic based on today's assessment. Continue current treatment plan. Follow up in 6 months.  Last edited 11/21/22 16:29 PST by Joby Gusman III, M.D.         Return if symptoms worsen or fail to improve.    Please note that this dictation was created using voice recognition software. I have made every reasonable attempt to correct obvious errors, but I expect that there are errors of grammar and possibly content that I did not discover before finalizing the note.

## 2023-01-24 ENCOUNTER — OFFICE VISIT (OUTPATIENT)
Dept: MEDICAL GROUP | Facility: PHYSICIAN GROUP | Age: 88
End: 2023-01-24
Payer: MEDICARE

## 2023-01-24 VITALS
SYSTOLIC BLOOD PRESSURE: 126 MMHG | HEART RATE: 64 BPM | WEIGHT: 212 LBS | BODY MASS INDEX: 35.32 KG/M2 | HEIGHT: 65 IN | DIASTOLIC BLOOD PRESSURE: 74 MMHG | TEMPERATURE: 98.6 F | OXYGEN SATURATION: 97 % | RESPIRATION RATE: 18 BRPM

## 2023-01-24 DIAGNOSIS — N18.31 STAGE 3A CHRONIC KIDNEY DISEASE: ICD-10-CM

## 2023-01-24 DIAGNOSIS — M54.50 LOW BACK PAIN WITHOUT SCIATICA, UNSPECIFIED BACK PAIN LATERALITY, UNSPECIFIED CHRONICITY: ICD-10-CM

## 2023-01-24 DIAGNOSIS — E78.2 MIXED HYPERLIPIDEMIA: ICD-10-CM

## 2023-01-24 DIAGNOSIS — F32.5 DEPRESSION, MAJOR, IN REMISSION (HCC): ICD-10-CM

## 2023-01-24 DIAGNOSIS — E66.01 MORBID (SEVERE) OBESITY DUE TO EXCESS CALORIES (HCC): ICD-10-CM

## 2023-01-24 DIAGNOSIS — D72.829 LEUKOCYTOSIS, UNSPECIFIED TYPE: ICD-10-CM

## 2023-01-24 DIAGNOSIS — E55.9 VITAMIN D DEFICIENCY: ICD-10-CM

## 2023-01-24 DIAGNOSIS — I70.0 ATHEROSCLEROSIS OF AORTA (HCC): ICD-10-CM

## 2023-01-24 DIAGNOSIS — R79.89 ELEVATED TSH: ICD-10-CM

## 2023-01-24 PROBLEM — R55 VASOVAGAL SYNCOPE: Status: RESOLVED | Noted: 2022-01-19 | Resolved: 2023-01-24

## 2023-01-24 PROCEDURE — 99214 OFFICE O/P EST MOD 30 MIN: CPT | Performed by: FAMILY MEDICINE

## 2023-01-24 ASSESSMENT — PATIENT HEALTH QUESTIONNAIRE - PHQ9: CLINICAL INTERPRETATION OF PHQ2 SCORE: 0

## 2023-01-24 ASSESSMENT — FIBROSIS 4 INDEX: FIB4 SCORE: 3.17

## 2023-01-24 NOTE — PROGRESS NOTES
Annual Health Assessment Questions:    1.  Are you currently engaging in any exercise or physical activity? Yes    2.  How would you describe your mood or emotional well-being today? fair    3.  Have you had any falls in the last year? No    4.  Have you noticed any problems with your balance or had difficulty walking? Yes    5.  In the last six months have you experienced any leakage of urine? YES    6. DPA/Advanced Directive: Patient has Durable Power of  on file.
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
## 2023-01-24 NOTE — ASSESSMENT & PLAN NOTE
This is a chronic problem.  Patient has subclinical hypothyroidism.  We will recheck her TSH to see if it is changed.  She has no symptoms of thyroid disease.

## 2023-01-24 NOTE — ASSESSMENT & PLAN NOTE
This is a chronic problem.  Patient's last GFR was improved at 60.  Previous numbers were in the 50s.  We will continue to follow and continue to advise to avoid NSAIDs.

## 2023-01-26 ENCOUNTER — HOSPITAL ENCOUNTER (OUTPATIENT)
Dept: LAB | Facility: MEDICAL CENTER | Age: 88
End: 2023-01-26
Attending: FAMILY MEDICINE
Payer: MEDICARE

## 2023-01-26 DIAGNOSIS — E55.9 VITAMIN D DEFICIENCY: ICD-10-CM

## 2023-01-26 DIAGNOSIS — E78.2 MIXED HYPERLIPIDEMIA: ICD-10-CM

## 2023-01-26 DIAGNOSIS — N18.31 STAGE 3A CHRONIC KIDNEY DISEASE: ICD-10-CM

## 2023-01-26 DIAGNOSIS — D72.829 LEUKOCYTOSIS, UNSPECIFIED TYPE: ICD-10-CM

## 2023-01-26 DIAGNOSIS — R79.89 ELEVATED TSH: ICD-10-CM

## 2023-01-26 LAB
25(OH)D3 SERPL-MCNC: 47 NG/ML (ref 30–100)
ALBUMIN SERPL BCP-MCNC: 4.1 G/DL (ref 3.2–4.9)
ALBUMIN/GLOB SERPL: 1.5 G/DL
ALP SERPL-CCNC: 54 U/L (ref 30–99)
ALT SERPL-CCNC: 17 U/L (ref 2–50)
ANION GAP SERPL CALC-SCNC: 10 MMOL/L (ref 7–16)
AST SERPL-CCNC: 20 U/L (ref 12–45)
BASOPHILS # BLD AUTO: 0.4 % (ref 0–1.8)
BASOPHILS # BLD: 0.03 K/UL (ref 0–0.12)
BILIRUB SERPL-MCNC: 0.6 MG/DL (ref 0.1–1.5)
BUN SERPL-MCNC: 18 MG/DL (ref 8–22)
CALCIUM ALBUM COR SERPL-MCNC: 9.5 MG/DL (ref 8.5–10.5)
CALCIUM SERPL-MCNC: 9.6 MG/DL (ref 8.5–10.5)
CHLORIDE SERPL-SCNC: 106 MMOL/L (ref 96–112)
CHOLEST SERPL-MCNC: 214 MG/DL (ref 100–199)
CO2 SERPL-SCNC: 25 MMOL/L (ref 20–33)
CREAT SERPL-MCNC: 0.93 MG/DL (ref 0.5–1.4)
EOSINOPHIL # BLD AUTO: 0.07 K/UL (ref 0–0.51)
EOSINOPHIL NFR BLD: 1 % (ref 0–6.9)
ERYTHROCYTE [DISTWIDTH] IN BLOOD BY AUTOMATED COUNT: 50.7 FL (ref 35.9–50)
FASTING STATUS PATIENT QL REPORTED: NORMAL
GFR SERPLBLD CREATININE-BSD FMLA CKD-EPI: 58 ML/MIN/1.73 M 2
GLOBULIN SER CALC-MCNC: 2.8 G/DL (ref 1.9–3.5)
GLUCOSE SERPL-MCNC: 89 MG/DL (ref 65–99)
HCT VFR BLD AUTO: 44 % (ref 37–47)
HDLC SERPL-MCNC: 34 MG/DL
HGB BLD-MCNC: 14.3 G/DL (ref 12–16)
IMM GRANULOCYTES # BLD AUTO: 0.02 K/UL (ref 0–0.11)
IMM GRANULOCYTES NFR BLD AUTO: 0.3 % (ref 0–0.9)
LDLC SERPL CALC-MCNC: 150 MG/DL
LYMPHOCYTES # BLD AUTO: 2.77 K/UL (ref 1–4.8)
LYMPHOCYTES NFR BLD: 41.1 % (ref 22–41)
MCH RBC QN AUTO: 32.4 PG (ref 27–33)
MCHC RBC AUTO-ENTMCNC: 32.5 G/DL (ref 33.6–35)
MCV RBC AUTO: 99.8 FL (ref 81.4–97.8)
MONOCYTES # BLD AUTO: 0.54 K/UL (ref 0–0.85)
MONOCYTES NFR BLD AUTO: 8 % (ref 0–13.4)
NEUTROPHILS # BLD AUTO: 3.31 K/UL (ref 2–7.15)
NEUTROPHILS NFR BLD: 49.2 % (ref 44–72)
NRBC # BLD AUTO: 0 K/UL
NRBC BLD-RTO: 0 /100 WBC
PLATELET # BLD AUTO: 207 K/UL (ref 164–446)
PMV BLD AUTO: 10.2 FL (ref 9–12.9)
POTASSIUM SERPL-SCNC: 4.5 MMOL/L (ref 3.6–5.5)
PROT SERPL-MCNC: 6.9 G/DL (ref 6–8.2)
RBC # BLD AUTO: 4.41 M/UL (ref 4.2–5.4)
SODIUM SERPL-SCNC: 141 MMOL/L (ref 135–145)
TRIGL SERPL-MCNC: 152 MG/DL (ref 0–149)
TSH SERPL DL<=0.005 MIU/L-ACNC: 4.03 UIU/ML (ref 0.38–5.33)
WBC # BLD AUTO: 6.7 K/UL (ref 4.8–10.8)

## 2023-01-26 PROCEDURE — 80061 LIPID PANEL: CPT

## 2023-01-26 PROCEDURE — 36415 COLL VENOUS BLD VENIPUNCTURE: CPT

## 2023-01-26 PROCEDURE — 82306 VITAMIN D 25 HYDROXY: CPT

## 2023-01-26 PROCEDURE — 80053 COMPREHEN METABOLIC PANEL: CPT

## 2023-01-26 PROCEDURE — 84443 ASSAY THYROID STIM HORMONE: CPT

## 2023-01-26 PROCEDURE — 85025 COMPLETE CBC W/AUTO DIFF WBC: CPT

## 2023-05-24 ENCOUNTER — PATIENT MESSAGE (OUTPATIENT)
Dept: HEALTH INFORMATION MANAGEMENT | Facility: OTHER | Age: 88
End: 2023-05-24

## 2023-05-24 ENCOUNTER — DOCUMENTATION (OUTPATIENT)
Dept: HEALTH INFORMATION MANAGEMENT | Facility: OTHER | Age: 88
End: 2023-05-24
Payer: MEDICARE

## 2023-07-25 ENCOUNTER — OFFICE VISIT (OUTPATIENT)
Dept: MEDICAL GROUP | Facility: PHYSICIAN GROUP | Age: 88
End: 2023-07-25
Payer: MEDICARE

## 2023-07-25 VITALS
SYSTOLIC BLOOD PRESSURE: 128 MMHG | HEART RATE: 64 BPM | RESPIRATION RATE: 18 BRPM | HEIGHT: 65 IN | OXYGEN SATURATION: 97 % | BODY MASS INDEX: 35.52 KG/M2 | WEIGHT: 213.2 LBS | DIASTOLIC BLOOD PRESSURE: 74 MMHG | TEMPERATURE: 97.7 F

## 2023-07-25 DIAGNOSIS — F32.5 DEPRESSION, MAJOR, IN REMISSION (HCC): ICD-10-CM

## 2023-07-25 DIAGNOSIS — N18.31 STAGE 3A CHRONIC KIDNEY DISEASE: ICD-10-CM

## 2023-07-25 DIAGNOSIS — M25.552 PAIN OF LEFT HIP: Chronic | ICD-10-CM
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
## 2023-07-25 PROBLEM — S81.802A LEG WOUND, LEFT: Status: RESOLVED | Noted: 2022-07-19 | Resolved: 2023-07-25
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
## 2023-07-25 PROBLEM — R79.89 ELEVATED TSH: Status: RESOLVED | Noted: 2021-07-20 | Resolved: 2023-07-25

## 2023-07-25 PROCEDURE — 3078F DIAST BP <80 MM HG: CPT | Performed by: FAMILY MEDICINE

## 2023-07-25 PROCEDURE — 99213 OFFICE O/P EST LOW 20 MIN: CPT | Performed by: FAMILY MEDICINE

## 2023-07-25 PROCEDURE — 3074F SYST BP LT 130 MM HG: CPT | Performed by: FAMILY MEDICINE

## 2023-07-25 ASSESSMENT — FIBROSIS 4 INDEX: FIB4 SCORE: 2.11

## 2023-07-25 NOTE — ASSESSMENT & PLAN NOTE
This is a chronic problem.  Patient does state that her pain is better now that she had been doing her exercises.  She uses her cane on a regular basis.

## 2023-07-25 NOTE — PROGRESS NOTES
Subjective:     CC: Patient is here for follow-up and to go over her health issues.    HPI:   Kayode presents today with the following medical concerns:    Hip pain  This is a chronic problem.  Patient does state that her pain is better now that she had been doing her exercises.  She uses her cane on a regular basis.    Stage 3a chronic kidney disease (HCC)  This is a chronic problem.  Her GFR was slightly decreased to 58 on her last check.  She knows to avoid NSAIDs.  We will continue to follow this and recheck it again at her next visit.    Body mass index (BMI) 35.0-35.9, adult  This is a chronic problem.  Weight is stable.  She does know it would be better to lose a little weight and we will continue to follow.    Depression, major, in remission (HCC)  This is a chronic problem.  Patient remains in remission.  We will continue to follow.    Past Medical History:   Diagnosis Date    Hip pain 1/22/2014    Mild depression        Social History     Tobacco Use    Smoking status: Never    Smokeless tobacco: Never   Vaping Use    Vaping Use: Never used   Substance Use Topics    Alcohol use: Yes     Alcohol/week: 0.0 oz     Comment: rare.     Drug use: Never       Current Outpatient Medications Ordered in Epic   Medication Sig Dispense Refill    vitamin D (D3-1000) 1000 UNIT Tab Take 2 Tablets by mouth every day.      Ascorbic Acid (VITAMIN C) 1000 MG Tab Take  by mouth.      therapeutic multivitamin-minerals (THERAGRAN-M) Tab Take 1 Tablet by mouth every day.       No current [facility]-ordered facility-administered medications on file.       Allergies:  Amoxicillin, Novocain, Other food, Bloodless, Peanut-derived, and Red dye #40 [fd&c red #40]    Health Maintenance: Completed    ROS:  Gen: no fevers/chills, no changes in weight  Eyes: no changes in vision  ENT: no sore throat, no hearing loss, no bloody nose  Pulm: no sob, no cough  CV: no chest pain, no palpitations  GI: no nausea/vomiting, no diarrhea  : no  "dysuria  MSk: no myalgias  Skin: no rash  Neuro: no headaches, no numbness/tingling  Heme/Lymph: no easy bruising      Objective:       Exam:  /74 (BP Location: Left arm, Patient Position: Sitting, BP Cuff Size: Adult)   Pulse 64   Temp 36.5 °C (97.7 °F) (Temporal)   Resp 18   Ht 1.651 m (5' 5\")   Wt 96.7 kg (213 lb 3.2 oz)   SpO2 97%   BMI 35.48 kg/m²  Body mass index is 35.48 kg/m².    Gen: Alert and oriented, No apparent distress.  Neck: Neck is supple without lymphadenopathy.  Lungs: Normal effort, CTA bilaterally, no wheezes, rhonchi, or rales  CV: Regular rate and rhythm. No murmurs, rubs, or gallops.  Ext: No clubbing, cyanosis, edema.  She does use a cane for support.      Labs: Reviewed with patient    Assessment & Plan:     89 y.o. female with the following -     1. Pain of left hip  This is a chronic improved problem.  Continue to follow.  Continue her exercises and use of the cane.    2. Stage 3a chronic kidney disease (HCC)  This is a chronic problem.  Continue to avoid NSAIDs.  We will recheck her labs in 6 months.    3. Body mass index (BMI) 35.0-35.9, adult  This is a chronic problem.  Continue to encourage weight reduction.    4. Depression, major, in remission (HCC)  This is a chronic stable condition.  Continue to follow.      Return in about 6 months (around 1/25/2024) for Long.    Please note that this dictation was created using voice recognition software. I have made every reasonable attempt to correct obvious errors, but I expect that there are errors of grammar and possibly content that I did not discover before finalizing the note.        "

## 2023-07-25 NOTE — ASSESSMENT & PLAN NOTE
This is a chronic problem.  Weight is stable.  She does know it would be better to lose a little weight and we will continue to follow.

## 2023-07-25 NOTE — ASSESSMENT & PLAN NOTE
This is a chronic problem.  Her GFR was slightly decreased to 58 on her last check.  She knows to avoid NSAIDs.  We will continue to follow this and recheck it again at her next visit.

## 2023-09-21 ENCOUNTER — TELEPHONE (OUTPATIENT)
Dept: MEDICAL GROUP | Facility: PHYSICIAN GROUP | Age: 88
End: 2023-09-21
Payer: MEDICARE

## 2023-09-22 NOTE — TELEPHONE ENCOUNTER
Patient's daughter called stating that she is taking her mom to [place] and was wondering if Dr. Gusman had any tips for the long flight. She mentioned compression stockings but was wondering about taking an aspirin and any other you might suggest

## 2023-09-24 SDOH — ECONOMIC STABILITY: FOOD INSECURITY: WITHIN THE PAST 12 MONTHS, YOU WORRIED THAT YOUR FOOD WOULD RUN OUT BEFORE YOU GOT MONEY TO BUY MORE.: NEVER TRUE

## 2023-09-24 SDOH — ECONOMIC STABILITY: FOOD INSECURITY: WITHIN THE PAST 12 MONTHS, THE FOOD YOU BOUGHT JUST DIDN'T LAST AND YOU DIDN'T HAVE MONEY TO GET MORE.: NEVER TRUE

## 2023-09-24 SDOH — HEALTH STABILITY: PHYSICAL HEALTH: ON AVERAGE, HOW MANY MINUTES DO YOU ENGAGE IN EXERCISE AT THIS LEVEL?: 40 MIN

## 2023-09-24 SDOH — HEALTH STABILITY: PHYSICAL HEALTH: ON AVERAGE, HOW MANY DAYS PER WEEK DO YOU ENGAGE IN MODERATE TO STRENUOUS EXERCISE (LIKE A BRISK WALK)?: 7 DAYS

## 2023-09-24 SDOH — ECONOMIC STABILITY: TRANSPORTATION INSECURITY
IN THE PAST 12 MONTHS, HAS LACK OF RELIABLE TRANSPORTATION KEPT YOU FROM MEDICAL APPOINTMENTS, MEETINGS, WORK OR FROM GETTING THINGS NEEDED FOR DAILY LIVING?: NO

## 2023-09-24 SDOH — ECONOMIC STABILITY: HOUSING INSECURITY

## 2023-09-24 SDOH — ECONOMIC STABILITY: INCOME INSECURITY: IN THE LAST 12 MONTHS, WAS THERE A TIME WHEN YOU WERE NOT ABLE TO PAY THE MORTGAGE OR RENT ON TIME?: NO

## 2023-09-24 SDOH — ECONOMIC STABILITY: INCOME INSECURITY: HOW HARD IS IT FOR YOU TO PAY FOR THE VERY BASICS LIKE FOOD, HOUSING, MEDICAL CARE, AND HEATING?: NOT HARD AT ALL

## 2023-09-24 ASSESSMENT — SOCIAL DETERMINANTS OF HEALTH (SDOH)
HOW OFTEN DO YOU HAVE A DRINK CONTAINING ALCOHOL: NEVER
HOW OFTEN DO YOU ATTENT MEETINGS OF THE CLUB OR ORGANIZATION YOU BELONG TO?: MORE THAN 4 TIMES PER YEAR
HOW OFTEN DO YOU ATTENT MEETINGS OF THE CLUB OR ORGANIZATION YOU BELONG TO?: MORE THAN 4 TIMES PER YEAR
HOW OFTEN DO YOU GET TOGETHER WITH FRIENDS OR RELATIVES?: MORE THAN THREE TIMES A WEEK
IN A TYPICAL WEEK, HOW MANY TIMES DO YOU TALK ON THE PHONE WITH FAMILY, FRIENDS, OR NEIGHBORS?: MORE THAN THREE TIMES A WEEK
HOW OFTEN DO YOU HAVE SIX OR MORE DRINKS ON ONE OCCASION: NEVER
DO YOU BELONG TO ANY CLUBS OR ORGANIZATIONS SUCH AS CHURCH GROUPS UNIONS, FRATERNAL OR ATHLETIC GROUPS, OR SCHOOL GROUPS?: YES
HOW HARD IS IT FOR YOU TO PAY FOR THE VERY BASICS LIKE FOOD, HOUSING, MEDICAL CARE, AND HEATING?: NOT HARD AT ALL
WITHIN THE PAST 12 MONTHS, YOU WORRIED THAT YOUR FOOD WOULD RUN OUT BEFORE YOU GOT THE MONEY TO BUY MORE: NEVER TRUE
HOW OFTEN DO YOU GET TOGETHER WITH FRIENDS OR RELATIVES?: MORE THAN THREE TIMES A WEEK
HOW OFTEN DO YOU ATTEND CHURCH OR RELIGIOUS SERVICES?: MORE THAN 4 TIMES PER YEAR
HOW MANY DRINKS CONTAINING ALCOHOL DO YOU HAVE ON A TYPICAL DAY WHEN YOU ARE DRINKING: PATIENT DOES NOT DRINK
IN A TYPICAL WEEK, HOW MANY TIMES DO YOU TALK ON THE PHONE WITH FAMILY, FRIENDS, OR NEIGHBORS?: MORE THAN THREE TIMES A WEEK
HOW OFTEN DO YOU ATTEND CHURCH OR RELIGIOUS SERVICES?: MORE THAN 4 TIMES PER YEAR
DO YOU BELONG TO ANY CLUBS OR ORGANIZATIONS SUCH AS CHURCH GROUPS UNIONS, FRATERNAL OR ATHLETIC GROUPS, OR SCHOOL GROUPS?: YES

## 2023-09-24 ASSESSMENT — LIFESTYLE VARIABLES
AUDIT-C TOTAL SCORE: 0
HOW OFTEN DO YOU HAVE A DRINK CONTAINING ALCOHOL: NEVER
SKIP TO QUESTIONS 9-10: 1
HOW OFTEN DO YOU HAVE SIX OR MORE DRINKS ON ONE OCCASION: NEVER
HOW MANY STANDARD DRINKS CONTAINING ALCOHOL DO YOU HAVE ON A TYPICAL DAY: PATIENT DOES NOT DRINK

## 2023-09-27 ENCOUNTER — OFFICE VISIT (OUTPATIENT)
Dept: MEDICAL GROUP | Facility: PHYSICIAN GROUP | Age: 88
End: 2023-09-27
Payer: MEDICARE

## 2023-09-27 VITALS
HEIGHT: 65 IN | WEIGHT: 208 LBS | TEMPERATURE: 97.5 F | BODY MASS INDEX: 34.66 KG/M2 | HEART RATE: 62 BPM | OXYGEN SATURATION: 96 % | RESPIRATION RATE: 18 BRPM | DIASTOLIC BLOOD PRESSURE: 70 MMHG | SYSTOLIC BLOOD PRESSURE: 126 MMHG

## 2023-09-27 DIAGNOSIS — R26.89 BALANCE DISORDER: ICD-10-CM

## 2023-09-27 DIAGNOSIS — Z23 NEED FOR VACCINATION: ICD-10-CM

## 2023-09-27 DIAGNOSIS — Z00.00 MEDICARE ANNUAL WELLNESS VISIT, SUBSEQUENT: ICD-10-CM

## 2023-09-27 PROCEDURE — 90662 IIV NO PRSV INCREASED AG IM: CPT | Performed by: FAMILY MEDICINE

## 2023-09-27 PROCEDURE — 3074F SYST BP LT 130 MM HG: CPT | Performed by: FAMILY MEDICINE

## 2023-09-27 PROCEDURE — G0008 ADMIN INFLUENZA VIRUS VAC: HCPCS | Performed by: FAMILY MEDICINE

## 2023-09-27 PROCEDURE — 3078F DIAST BP <80 MM HG: CPT | Performed by: FAMILY MEDICINE

## 2023-09-27 PROCEDURE — G0439 PPPS, SUBSEQ VISIT: HCPCS | Mod: 25 | Performed by: FAMILY MEDICINE

## 2023-09-27 ASSESSMENT — FIBROSIS 4 INDEX: FIB4 SCORE: 2.11

## 2023-09-27 ASSESSMENT — PATIENT HEALTH QUESTIONNAIRE - PHQ9: CLINICAL INTERPRETATION OF PHQ2 SCORE: 0

## 2023-09-27 ASSESSMENT — ACTIVITIES OF DAILY LIVING (ADL): BATHING_REQUIRES_ASSISTANCE: 0

## 2023-09-27 ASSESSMENT — ENCOUNTER SYMPTOMS: GENERAL WELL-BEING: GOOD

## 2023-09-27 NOTE — PROGRESS NOTES
Chief Complaint   Patient presents with    Annual Wellness Visit       HPI:  Kayode Winters is a 89 y.o. here for Medicare Annual Wellness Visit   Medicare annual wellness visit, subsequent  Patient is here today for her Medicare wellness exam.  No changes since last visit.  She is accompanied by her daughter.  She also ask on some travel advice is they are going to [place] and should be on the plane for almost 8 hours.  I advised her to use compression stockings and she could take an aspirin a day starting the day before and stopping the day after travel just to be sure.  Also encouraged her to hydrate regularly.    Balance disorder  This is a chronic problem.  Continue to monitor and be safe when walking.    Patient Active Problem List    Diagnosis Date Noted    Medicare annual wellness visit, subsequent 09/27/2023    BMI 34.0-34.9,adult 01/24/2023    Balance disorder 07/19/2022    Atherosclerosis of aorta (HCC) 01/19/2022    Peanut allergy 01/19/2022    Depression, major, in remission (HCC) 10/20/2021    Stage 3a chronic kidney disease (HCC) 07/20/2021    Mixed hyperlipidemia 07/20/2021    Patient is Jewish 02/20/2018    Vitamin D deficiency 02/20/2018    Memory impairment 12/05/2017    Low back pain 10/25/2016    Hip pain 01/22/2014    Morbid (severe) obesity due to excess calories ([facility])        Current Outpatient Medications   Medication Sig Dispense Refill    vitamin D (D3-1000) 1000 UNIT Tab Take 2 Tablets by mouth every day.      Ascorbic Acid (VITAMIN C) 1000 MG Tab Take  by mouth.      therapeutic multivitamin-minerals (THERAGRAN-M) Tab Take 1 Tablet by mouth every day.       No current facility-administered medications for this visit.          Current supplements as per medication list.     Allergies: Amoxicillin, Novocain, Other food, Bloodless, Peanut-derived, and Red dye #40 [fd&c red #40]    Current social contact/activities: Active in [employer]     She  reports that she has never smoked.  She has never used smokeless tobacco. She reports current alcohol use. She reports that she does not use drugs.  Counseling given: Not Answered      ROS:    Gait: Uses a cane  Ostomy: No  Other tubes: No  Amputations: No  Chronic oxygen use: No  Last eye exam: 8 years  Wears hearing aids: No   : Denies any urinary leakage during the last 6 months    Screening:  done  Depression Screening  Little interest or pleasure in doing things?  0 - not at all  Feeling down, depressed , or hopeless? 0 - not at all  Trouble falling or staying asleep, or sleeping too much?     Feeling tired or having little energy?     Poor appetite or overeating?     Feeling bad about yourself - or that you are a failure or have let yourself or your family down?    Trouble concentrating on things, such as reading the newspaper or watching television?    Moving or speaking so slowly that other people could have noticed.  Or the opposite - being so fidgety or restless that you have been moving around a lot more than usual?     Thoughts that you would be better off dead, or of hurting yourself?     Patient Health Questionnaire Score:      If depressive symptoms identified deferred to follow up visit unless specifically addressed in assessment and plan.    Interpretation of PHQ-9 Total Score   Score Severity   1-4 No Depression   5-9 Mild Depression   10-14 Moderate Depression   15-19 Moderately Severe Depression   20-27 Severe Depression    Screening for Cognitive Impairment  Do you or any of your friends or family members have any concern about your memory? No  Three Minute Recall (Banana, Sunrise, Chair) 2/3    Juan Jose clock face with all 12 numbers and set the hands to show 20 past 8.  Yes 5/5  Cognitive concerns identified deferred for follow up unless specifically addressed in assessment and plan.    Fall Risk Assessment  Has the patient had two or more falls in the last year or any fall with injury in the last year?  Yes    Safety  Assessment  Do you always wear your seatbelt?  Yes  Any changes to home needed to function safely? No  Difficulty hearing.  Yes  Patient counseled about all safety risks that were identified.    Functional Assessment ADLs  Are there any barriers preventing you from cooking for yourself or meeting nutritional needs?  No.    Are there any barriers preventing you from driving safely or obtaining transportation?  No.    Are there any barriers preventing you from using a telephone or calling for help?  No    Are there any barriers preventing you from shopping?  No.    Are there any barriers preventing you from taking care of your own finances?  No    Are there any barriers preventing you from managing your medications?  No    Are there any barriers preventing you from showering, bathing or dressing yourself? No    Are there any barriers preventing you from doing housework or laundry? No  Are there any barriers preventing you from using the toilet?No  Are you currently engaging in any exercise or physical activity?  Yes.      Self-Assessment of Health  What is your perception of your health? Good  Do you sleep more than six hours a night? Yes  In the past 7 days, how much did pain keep you from doing your normal work? None  Do you spend quality time with family or friends (virtually or in person)? Yes  Do you usually eat a heart healthy diet that constists of a variety of fruits, vegetables, whole grains and fiber? Yes  Do you eat foods high in fat and/or Fast Food more than three times per week? No    Advance Care Planning  Do you have an Advance Directive, Living Will, Durable Power of , or POLST? Yes      Durable Power of    is on file      Health Maintenance Summary            Postponed - IMM DTaP/Tdap/Td Vaccine (1 - Tdap) Postponed until 11/21/2023      No completion history exists for this topic.              Postponed - Zoster (Shingles) Vaccines (2 of 2) Postponed until 12/25/2023 02/05/2020   Imm Admin: Zoster Vaccine Recombinant (RZV) (SHINGRIX)    02/05/2020  Imm Admin: Zoster Vaccine Recombinant (RZV) (SHINGRIX)              Annual Wellness Visit (Every 366 Days) Next due on 9/27/2024 09/27/2023  Prob Dx: Medicare annual wellness visit, subsequent    09/27/2023  Visit Dx: Medicare annual wellness visit, subsequent    01/19/2022  Prob Dx: Medicare annual wellness visit, subsequent    10/20/2021  Done    10/20/2021  Level of Service: [place] ANNUAL WELLNESS VISIT-INCLUDES PPPS SUBSEQUE*    Only the first 5 history entries have been loaded, but more history exists.              Bone Density Scan (Every 5 Years) Next due on 9/1/2026 09/01/2021  DS-BONE DENSITY STUDY (DEXA)    01/22/2013  Previously completed              Pneumococcal Vaccine: 65+ Years (Series Information) Completed      10/06/2015  Imm Admin: Pneumococcal Conjugate Vaccine (Prevnar/PCV-13)    11/09/2007  Imm Admin: Pneumococcal polysaccharide vaccine (PPSV-23)              COVID-19 Vaccine (Series Information) Completed      05/03/2023  Imm Admin: PFIZER BIVALENT SARS-COV-2 VACCINE (12+)    09/15/2022  Imm Admin: PFIZER BIVALENT SARS-COV-2 VACCINE (12+)    04/03/2022  Imm Admin: PFIZER HOLDER CAP SARS-COV-2 VACCINATION (12+)    10/06/2021  Imm Admin: PFIZER PURPLE CAP SARS-COV-2 VACCINATION (12+)    02/05/2021  Imm Admin: PFIZER PURPLE CAP SARS-COV-2 VACCINATION (12+)    Only the first 5 history entries have been loaded, but more history exists.              Influenza Vaccine (Series Information) Completed      09/27/2023  Imm Admin: Influenza Vaccine Adult HD    09/15/2022  Imm Admin: Influenza Vaccine Adult HD    09/15/2021  Imm Admin: Influenza Vaccine Adult HD    09/27/2020  Imm Admin: Influenza Vaccine Quad Inj (Pf)    09/21/2019  Imm Admin: Influenza Vaccine Adult HD    Only the first 5 history entries have been loaded, but more history exists.              Hepatitis A Vaccine (Hep A) (Series Information) Aged Out      No  "completion history exists for this topic.              HPV Vaccines (Series Information) Aged Out      No completion history exists for this topic.              Polio Vaccine (Inactivated Polio) (Series Information) Aged Out      No completion history exists for this topic.              Meningococcal Immunization (Series Information) Aged Out      No completion history exists for this topic.              Discontinued - Mammogram  Discontinued        Frequency changed to Never automatically (Topic No Longer Applies)    09/01/2021  MA-SCREENING MAMMO BILAT W/TOMOSYNTHESIS W/CAD    08/12/2013  MA-SCREENING MAMMOGRAM W/ CAD    01/23/2012  MA-SCREEING MAMMOGRAM W/ CAD    04/05/2010  MA-SCREENING DIGITAL MAMMO    Only the first 5 history entries have been loaded, but more history exists.              Discontinued - Hepatitis B Vaccine (Hep B)  Discontinued      No completion history exists for this topic.                    Patient Care Team:  Joby Gusman III, M.D. as PCP - General (Family Medicine)  Dominique Darling R.N. as Chronic Care Manager (Registered Nurse)      Social History     Tobacco Use    Smoking status: Never    Smokeless tobacco: Never   Vaping Use    Vaping Use: Never used   Substance Use Topics    Alcohol use: Yes     Comment: wine occ    Drug use: Never     Family History   Problem Relation Age of Onset    Depression Sister     Genetic Disorder Neg Hx      She  has a past medical history of Hip pain (1/22/2014) and Mild depression.    She has no past medical history of Arthritis, ASTHMA, Breast cancer (HCC), Diabetic neuropathy (HCC), or GERD (gastroesophageal reflux disease).   Past Surgical History:   Procedure Laterality Date    APPENDECTOMY         Exam:   /70 (BP Location: Right arm, Patient Position: Sitting, BP Cuff Size: Adult)   Pulse 62   Temp 36.4 °C (97.5 °F) (Temporal)   Resp 18   Ht 1.651 m (5' 5\")   Wt 94.3 kg (208 lb)   SpO2 96%  Body mass index is 34.61 kg/m².    Hearing " fair.    Dentition upper dentures  Alert, oriented in no acute distress.  Eye contact is good, speech goal directed, affect calm    Assessment and Plan. The following treatment and monitoring plan is recommended:    1. Need for vaccination  Flu vaccine given today.  - Influenza Vaccine, High Dose (65+ Only)    2. Balance disorder  This is a chronic problem.  Continue to monitor.  - Patient identified as fall risk.  Appropriate orders and counseling given.    3. Medicare annual wellness visit, subsequent  Patient's wellness exam was done today.  She passed all the test.  We will continue to follow.  Travel advice given today as well.      Services suggested: No services needed at this time  Health Care Screening: Age-appropriate preventive services recommended by USPTF and ACIP covered by Medicare were discussed today. Services ordered if indicated and agreed upon by the patient.  Referrals offered: Community-based lifestyle interventions to reduce health risks and promote self-management and wellness, fall prevention, nutrition, physical activity, tobacco-use cessation, weight loss, and mental health services as per orders if indicated.    Discussion today about general wellness and lifestyle habits:    Prevent falls and reduce trip hazards; Cautioned about securing or removing rugs.  Have a working fire alarm and carbon monoxide detector;   Engage in regular physical activity and social activities     Follow-up: Return in about 3 months (around 1/8/2024) for Long, f/view labs.

## 2023-09-27 NOTE — ASSESSMENT & PLAN NOTE
Patient is here today for her Medicare wellness exam.  No changes since last visit.  She is accompanied by her daughter.  She also ask on some travel advice is they are going to [place] and should be on the plane for almost 8 hours.  I advised her to use compression stockings and she could take an aspirin a day starting the day before and stopping the day after travel just to be sure.  Also encouraged her to hydrate regularly.

## 2024-01-21 ENCOUNTER — HOSPITAL ENCOUNTER (EMERGENCY)
Facility: MEDICAL CENTER | Age: 89
End: 2024-01-21
Attending: STUDENT IN AN ORGANIZED HEALTH CARE EDUCATION/TRAINING PROGRAM
Payer: MEDICARE

## 2024-01-21 ENCOUNTER — APPOINTMENT (OUTPATIENT)
Dept: RADIOLOGY | Facility: MEDICAL CENTER | Age: 89
End: 2024-01-21
Attending: STUDENT IN AN ORGANIZED HEALTH CARE EDUCATION/TRAINING PROGRAM
Payer: MEDICARE

## 2024-01-21 VITALS
HEART RATE: 73 BPM | BODY MASS INDEX: 33.32 KG/M2 | OXYGEN SATURATION: 91 % | SYSTOLIC BLOOD PRESSURE: 151 MMHG | TEMPERATURE: 97.7 F | RESPIRATION RATE: 20 BRPM | DIASTOLIC BLOOD PRESSURE: 67 MMHG | HEIGHT: 65 IN | WEIGHT: 200 LBS

## 2024-01-21 DIAGNOSIS — R03.0 ELEVATED BLOOD PRESSURE READING: ICD-10-CM

## 2024-01-21 DIAGNOSIS — R19.7 DIARRHEA, UNSPECIFIED TYPE: ICD-10-CM

## 2024-01-21 DIAGNOSIS — R73.9 HYPERGLYCEMIA: ICD-10-CM

## 2024-01-21 DIAGNOSIS — R11.2 NAUSEA AND VOMITING, UNSPECIFIED VOMITING TYPE: ICD-10-CM

## 2024-01-21 LAB
ALBUMIN SERPL BCP-MCNC: 3.9 G/DL (ref 3.2–4.9)
ALBUMIN/GLOB SERPL: 1.4 G/DL
ALP SERPL-CCNC: 48 U/L (ref 30–99)
ALT SERPL-CCNC: 13 U/L (ref 2–50)
ANION GAP SERPL CALC-SCNC: 14 MMOL/L (ref 7–16)
APPEARANCE UR: CLEAR
AST SERPL-CCNC: 21 U/L (ref 12–45)
BASOPHILS # BLD AUTO: 0.1 % (ref 0–1.8)
BASOPHILS # BLD: 0.01 K/UL (ref 0–0.12)
BILIRUB SERPL-MCNC: 0.6 MG/DL (ref 0.1–1.5)
BILIRUB UR QL STRIP.AUTO: NEGATIVE
BUN SERPL-MCNC: 26 MG/DL (ref 8–22)
CALCIUM ALBUM COR SERPL-MCNC: 8.2 MG/DL (ref 8.5–10.5)
CALCIUM SERPL-MCNC: 8.1 MG/DL (ref 8.5–10.5)
CHLORIDE SERPL-SCNC: 102 MMOL/L (ref 96–112)
CO2 SERPL-SCNC: 21 MMOL/L (ref 20–33)
COLOR UR: YELLOW
CREAT SERPL-MCNC: 1.11 MG/DL (ref 0.5–1.4)
EOSINOPHIL # BLD AUTO: 0 K/UL (ref 0–0.51)
EOSINOPHIL NFR BLD: 0 % (ref 0–6.9)
ERYTHROCYTE [DISTWIDTH] IN BLOOD BY AUTOMATED COUNT: 47.8 FL (ref 35.9–50)
GFR SERPLBLD CREATININE-BSD FMLA CKD-EPI: 47 ML/MIN/1.73 M 2
GLOBULIN SER CALC-MCNC: 2.8 G/DL (ref 1.9–3.5)
GLUCOSE SERPL-MCNC: 109 MG/DL (ref 65–99)
GLUCOSE UR STRIP.AUTO-MCNC: NEGATIVE MG/DL
HCT VFR BLD AUTO: 40.1 % (ref 37–47)
HGB BLD-MCNC: 13.9 G/DL (ref 12–16)
IMM GRANULOCYTES # BLD AUTO: 0.05 K/UL (ref 0–0.11)
IMM GRANULOCYTES NFR BLD AUTO: 0.7 % (ref 0–0.9)
KETONES UR STRIP.AUTO-MCNC: NEGATIVE MG/DL
LEUKOCYTE ESTERASE UR QL STRIP.AUTO: NEGATIVE
LIPASE SERPL-CCNC: 23 U/L (ref 11–82)
LYMPHOCYTES # BLD AUTO: 1.08 K/UL (ref 1–4.8)
LYMPHOCYTES NFR BLD: 14.7 % (ref 22–41)
MCH RBC QN AUTO: 33.4 PG (ref 27–33)
MCHC RBC AUTO-ENTMCNC: 34.7 G/DL (ref 32.2–35.5)
MCV RBC AUTO: 96.4 FL (ref 81.4–97.8)
MICRO URNS: NORMAL
MONOCYTES # BLD AUTO: 0.43 K/UL (ref 0–0.85)
MONOCYTES NFR BLD AUTO: 5.8 % (ref 0–13.4)
NEUTROPHILS # BLD AUTO: 5.79 K/UL (ref 1.82–7.42)
NEUTROPHILS NFR BLD: 78.7 % (ref 44–72)
NITRITE UR QL STRIP.AUTO: NEGATIVE
NRBC # BLD AUTO: 0 K/UL
NRBC BLD-RTO: 0 /100 WBC (ref 0–0.2)
PH UR STRIP.AUTO: 5 [PH] (ref 5–8)
PLATELET # BLD AUTO: 161 K/UL (ref 164–446)
PMV BLD AUTO: 8.9 FL (ref 9–12.9)
POTASSIUM SERPL-SCNC: 3.9 MMOL/L (ref 3.6–5.5)
PROT SERPL-MCNC: 6.7 G/DL (ref 6–8.2)
PROT UR QL STRIP: NEGATIVE MG/DL
RBC # BLD AUTO: 4.16 M/UL (ref 4.2–5.4)
RBC UR QL AUTO: NEGATIVE
SODIUM SERPL-SCNC: 137 MMOL/L (ref 135–145)
SP GR UR STRIP.AUTO: 1.01
UROBILINOGEN UR STRIP.AUTO-MCNC: 0.2 MG/DL
WBC # BLD AUTO: 7.4 K/UL (ref 4.8–10.8)

## 2024-01-21 PROCEDURE — 36415 COLL VENOUS BLD VENIPUNCTURE: CPT

## 2024-01-21 PROCEDURE — 74177 CT ABD & PELVIS W/CONTRAST: CPT

## 2024-01-21 PROCEDURE — 99285 EMERGENCY DEPT VISIT HI MDM: CPT

## 2024-01-21 PROCEDURE — 85025 COMPLETE CBC W/AUTO DIFF WBC: CPT

## 2024-01-21 PROCEDURE — 700117 HCHG RX CONTRAST REV CODE 255: Performed by: STUDENT IN AN ORGANIZED HEALTH CARE EDUCATION/TRAINING PROGRAM

## 2024-01-21 PROCEDURE — 83690 ASSAY OF LIPASE: CPT

## 2024-01-21 PROCEDURE — 80053 COMPREHEN METABOLIC PANEL: CPT

## 2024-01-21 PROCEDURE — 700105 HCHG RX REV CODE 258: Performed by: STUDENT IN AN ORGANIZED HEALTH CARE EDUCATION/TRAINING PROGRAM

## 2024-01-21 PROCEDURE — 81003 URINALYSIS AUTO W/O SCOPE: CPT

## 2024-01-21 RX ORDER — SODIUM CHLORIDE 9 MG/ML
1000 INJECTION, SOLUTION INTRAVENOUS ONCE
Status: COMPLETED | OUTPATIENT
Start: 2024-01-21 | End: 2024-01-21

## 2024-01-21 RX ORDER — ONDANSETRON 4 MG/1
4 TABLET, ORALLY DISINTEGRATING ORAL EVERY 6 HOURS PRN
Qty: 10 TABLET | Refills: 0 | Status: SHIPPED | OUTPATIENT
Start: 2024-01-21 | End: 2024-02-06

## 2024-01-21 RX ADMIN — SODIUM CHLORIDE 1000 ML: 9 INJECTION, SOLUTION INTRAVENOUS at 16:08

## 2024-01-21 RX ADMIN — IOHEXOL 100 ML: 350 INJECTION, SOLUTION INTRAVENOUS at 17:42

## 2024-01-21 ASSESSMENT — FIBROSIS 4 INDEX: FIB4 SCORE: 2.11

## 2024-01-21 ASSESSMENT — PAIN DESCRIPTION - PAIN TYPE: TYPE: ACUTE PAIN

## 2024-01-21 NOTE — ED TRIAGE NOTES
.  Chief Complaint   Patient presents with    Abdominal Pain    Diarrhea    Weakness     Biba from home. Reports symptoms started yesterday denies blood in stool. Pain to left upper abd worse with palpation. Reports taking imodium pta with + relief   SCLEROTHERAPY AFTER CARE  Immediately:  After treatment, walk for 10-15 minutes before getting in your car.  If your trip home is more than 1 hour, stop and walk around for 5-10 minutes. Avoid sitting or standing for extended periods.   First 24 hours: Wear your compression continuously, even while in bed. After the 24 hours, you may shower if you want to. Put your hose back on, unless you are going to bed. You should NOT wear compression to bed after the first 24 hours. You may fly the next day, but wear your compression.   For 5 days: Wear the compression hose for waking hours only. You may continue to wear them longer than 5 days if you prefer.   For days 5-7: Walking is encouraged, as it promotes efficient circulation in your veins. You may do activities that raise your heart rate, but do NOT run, jog, do high impact aerobics, or weight lifting. After 7 days, no activity restrictions.    Shaving: Wait a few days to shave or apply lotion.   Bathing: Do NOT take hot baths or sit in a hot tub for 7-10 days.    For 1 year: Wear SPF 30 sunscreen on your legs when in the sun. This is very important! It helps prevent darkening of the skin at the injection sites.   Medications: You may resume your usual medications, including aspirin or ibuprofen.    Common Things to Expect  -  Compression must be worn for the first 24 hours and then during the day for 5-7 days.    -  If larger veins are treated with ultrasound-guided sclerotherapy, you will have redness, firmness, tenderness, and swelling.  This firmness and tenderness may take 3-6 months to resolve. Ibuprofen and compression hose will aid in this process.    -  You will have bruising that can last up to 3 weeks. Most fading of the veins will occur between 3 and 6 weeks after treatment.    -  You may notice brown discoloration (hyperpigmentation) at the treatment site.  This should fade with time, but will take 3 months to 1 year to fully heal.     -  Some treated  veins may look darker because of trapped blood within the vein. This trapped blood can be removed at a minimum of 1 month following treatment. Larger veins are more likely to develop trapped blood.    -  It is very important for you to use at least SPF 30 sunscreen in order to help prevent the discoloration of your skin.    -  Migraines rarely occur following sclerotherapy, but are more likely in patients with a history of migraines.  Treat as you would any other migraine.

## 2024-01-22 NOTE — ED PROVIDER NOTES
ED Provider Note    CHIEF COMPLAINT  Chief Complaint   Patient presents with    Abdominal Pain    Diarrhea    Weakness       EXTERNAL RECORDS REVIEWED  Outpatient Notes office visit 9/27/2023 for wellness visit and discussed balance disorder.  Patient has previous visits for stage IIIa chronic kidney disease as well.    HPI/ROS  LIMITATION TO HISTORY   Select: : None  OUTSIDE HISTORIAN(S):  None    Kayode Winters is a 89 y.o. female who presents due to complaint of diarrhea.  Diarrhea started about midnight.  Patient describes it as watery and brown, no blood or black stool.  Does not typically get diarrhea.  Is not having any associated abdominal pain.  She is attributing it to drinking half a bottle of wine last night which she does not typically do.  She is otherwise not complaining of dysuria, hematuria, abdominal pain, vomiting or nausea.  She has been tolerating a normal diet today.  The diarrhea has been continuous prompting her need for evaluation here.  She notes she is being worked up for a thyroid problem but otherwise only takes vitamins regularly.    PAST MEDICAL HISTORY   has a past medical history of Hip pain (1/22/2014) and Mild depression.    SURGICAL HISTORY   has a past surgical history that includes appendectomy.    FAMILY HISTORY  Family History   Problem Relation Age of Onset    Depression Sister     Genetic Disorder Neg Hx        SOCIAL HISTORY  Social History     Tobacco Use    Smoking status: Never    Smokeless tobacco: Never   Vaping Use    Vaping Use: Never used   Substance and Sexual Activity    Alcohol use: Yes     Comment: wine occ    Drug use: Never    Sexual activity: Not Currently       CURRENT MEDICATIONS  Home Medications       Reviewed by Yane Vázquez R.N. (Registered Nurse) on 01/21/24 at 1553  Med List Status: Partial     Medication Last Dose Status   Ascorbic Acid (VITAMIN C) 1000 MG Tab  Active   therapeutic multivitamin-minerals (THERAGRAN-M) Tab  Active   vitamin  "D (D3-1000) 1000 UNIT Tab  Active                    ALLERGIES  Allergies   Allergen Reactions    Amoxicillin Unspecified     Per pt states to getting extreme weakness and pt could not move much, this then got followed by body aches    Novocain Contact Dermatitis    Other Food Contact Dermatitis     Pt is allergic to peanuts and peanut products.    Bloodless     Peanut-Derived     Red Dye #40 [Fd&C Red #40]        PHYSICAL EXAM  VITAL SIGNS: BP (!) 151/67   Pulse 73   Temp 36.5 °C (97.7 °F)   Resp 20   Ht 1.651 m (5' 5\")   Wt 90.7 kg (200 lb)   SpO2 91%   BMI 33.28 kg/m²    Constitutional: Awake and alert. No acute distress.  Head: NCAT.  HEENT: Normal Conjunctiva. PERRLA.  Neck: Grossly normal range of motion. Airway midline.  Cardiovascular: Normal heart rate, Normal rhythm.  Thorax & Lungs: No respiratory distress. Clear to Auscultation bilaterally.  Abdomen: Normal inspection. Nontender. Nondistended  Skin: No obvious rash.  Back: No tenderness, No CVA tenderness.   Musculoskeletal: No obvious deformity. Moves all extremities Well.  Neurologic: A&Ox3.   Psychiatric: Mood and affect are appropriate for situation.    LABS  Results for orders placed or performed during the hospital encounter of 01/21/24   CBC with Differential   Result Value Ref Range    WBC 7.4 4.8 - 10.8 K/uL    RBC 4.16 (L) 4.20 - 5.40 M/uL    Hemoglobin 13.9 12.0 - 16.0 g/dL    Hematocrit 40.1 37.0 - 47.0 %    MCV 96.4 81.4 - 97.8 fL    MCH 33.4 (H) 27.0 - 33.0 pg    MCHC 34.7 32.2 - 35.5 g/dL    RDW 47.8 35.9 - 50.0 fL    Platelet Count 161 (L) 164 - 446 K/uL    MPV 8.9 (L) 9.0 - 12.9 fL    Neutrophils-Polys 78.70 (H) 44.00 - 72.00 %    Lymphocytes 14.70 (L) 22.00 - 41.00 %    Monocytes 5.80 0.00 - 13.40 %    Eosinophils 0.00 0.00 - 6.90 %    Basophils 0.10 0.00 - 1.80 %    Immature Granulocytes 0.70 0.00 - 0.90 %    Nucleated RBC 0.00 0.00 - 0.20 /100 WBC    Neutrophils (Absolute) 5.79 1.82 - 7.42 K/uL    Lymphs (Absolute) 1.08 1.00 - " 4.80 K/uL    Monos (Absolute) 0.43 0.00 - 0.85 K/uL    Eos (Absolute) 0.00 0.00 - 0.51 K/uL    Baso (Absolute) 0.01 0.00 - 0.12 K/uL    Immature Granulocytes (abs) 0.05 0.00 - 0.11 K/uL    NRBC (Absolute) 0.00 K/uL   Complete Metabolic Panel   Result Value Ref Range    Sodium 137 135 - 145 mmol/L    Potassium 3.9 3.6 - 5.5 mmol/L    Chloride 102 96 - 112 mmol/L    Co2 21 20 - 33 mmol/L    Anion Gap 14.0 7.0 - 16.0    Glucose 109 (H) 65 - 99 mg/dL    Bun 26 (H) 8 - 22 mg/dL    Creatinine 1.11 0.50 - 1.40 mg/dL    Calcium 8.1 (L) 8.5 - 10.5 mg/dL    Correct Calcium 8.2 (L) 8.5 - 10.5 mg/dL    AST(SGOT) 21 12 - 45 U/L    ALT(SGPT) 13 2 - 50 U/L    Alkaline Phosphatase 48 30 - 99 U/L    Total Bilirubin 0.6 0.1 - 1.5 mg/dL    Albumin 3.9 3.2 - 4.9 g/dL    Total Protein 6.7 6.0 - 8.2 g/dL    Globulin 2.8 1.9 - 3.5 g/dL    A-G Ratio 1.4 g/dL   Lipase   Result Value Ref Range    Lipase 23 11 - 82 U/L   Urinalysis    Specimen: Urine   Result Value Ref Range    Color Yellow     Character Clear     Specific Gravity 1.008 <1.035    Ph 5.0 5.0 - 8.0    Glucose Negative Negative mg/dL    Ketones Negative Negative mg/dL    Protein Negative Negative mg/dL    Bilirubin Negative Negative    Urobilinogen, Urine 0.2 Negative    Nitrite Negative Negative    Leukocyte Esterase Negative Negative    Occult Blood Negative Negative    Micro Urine Req see below    ESTIMATED GFR   Result Value Ref Range    GFR (CKD-EPI) 47 (A) >60 mL/min/1.73 m 2         RADIOLOGY  I have independently interpreted the diagnostic imaging associated with this visit and am waiting the final reading from the radiologist.   My preliminary interpretation is as follows: No obstruction, no acute inflammatory process  Radiologist interpretation:   CT-ABDOMEN-PELVIS WITH   Final Result      1.  There is a small hiatal hernia.   2.  There is a slight swirling of the mesenteric vessels involving the left upper quadrant and the proximal jejunum with questionable mild wall  thickening of a short segment loop of jejunum. Potentially this could represent an internal hernia or slight    twisting of the bowel but there is no obstructive change or acute inflammation.   3.  Distal colonic diverticulosis without diverticulitis.            COURSE & MEDICAL DECISION MAKING    ED Observation Status? No; Patient does not meet criteria for ED Observation.     INITIAL ASSESSMENT, COURSE AND PLAN  Care Narrative:   89-year-old female who denies chronic medical conditions here with 1 episode of vomiting, diarrhea onset midnight without fevers or abdominal pain.  Afebrile, hypertensive on arrival  On exam soft abdomen, does have left upper quadrant tenderness to palpation but no rebound or guarding.  No Iniguez or McBurney sign.  Differential includes gastroenteritis, foodborne illness, intestinal obstruction, diverticulitis or colitis, electrolyte derangement, melena, abdominal infection.  Low suspicion for C. difficile or acute bacterial diarrhea.  Treated with IV fluids.  Patient denies nausea and pain medication at this time.  She notes no pain with laying here in the gurney.  Labs with no leukocytosis, no electrolyte derangements, mild hyperglycemia, kidney function is slightly diminished in this 89-year-old patient.  Urine without infection, LFTs are not elevated, lipase is normal.  Advised of reassuring workup.  We discussed the risks and benefits of CT imaging.  She has that left upper quadrant tenderness that is inexplicable we will therefore pursue imaging to rule out obstruction or other insidious process.  CT with no acute findings.  There is comment on internal hernia or slightly twisted bowel but at this time this does not seem consistent with exam, history or objective data.  Will discharge with Zofran.  Family already has Imodium.  If symptoms persistent, bloody diarrhea or new onset pain please seek reevaluation.    HTN/IDDM FOLLOW UP:  The patient is referred to a primary physician  for blood pressure management, diabetic screening, and for all other preventive health concerns      ADDITIONAL PROBLEM LIST    Elevated blood pressure  Hyperglycemia  CKD  DISPOSITION AND DISCUSSIONS  I have discussed management of the patient with the following physicians and PATTIE's:  None    Discussion of management with other QHP or appropriate source(s): None     Escalation of care considered, and ultimately not performed:IV fluids, blood analysis, diagnostic imaging, and acute inpatient care management, however at this time, the patient is most appropriate for outpatient management    Barriers to care at this time, including but not limited to:  None .     Decision tools and prescription drugs considered including, but not limited to: Medication modification zofran as needed, imodium as needed .    FINAL DIAGNOSIS  1. Nausea and vomiting, unspecified vomiting type    2. Diarrhea, unspecified type    3. Elevated blood pressure reading    4. Hyperglycemia           Electronically signed by: Kishan Perales D.O., 1/21/2024 4:01 PM

## 2024-01-22 NOTE — DISCHARGE INSTRUCTIONS
Please take the Zofran as needed for nausea or vomiting.  You can consider taking Imodium for diarrhea but typically recommend that you allow it to pass.  Your labs are reassuring with no derangements.  Your CT with no acute findings.

## 2024-02-06 ENCOUNTER — OFFICE VISIT (OUTPATIENT)
Dept: MEDICAL GROUP | Facility: PHYSICIAN GROUP | Age: 89
End: 2024-02-06
Payer: MEDICARE

## 2024-02-06 VITALS
HEART RATE: 74 BPM | TEMPERATURE: 97.8 F | HEIGHT: 65 IN | OXYGEN SATURATION: 96 % | WEIGHT: 207 LBS | DIASTOLIC BLOOD PRESSURE: 72 MMHG | BODY MASS INDEX: 34.49 KG/M2 | RESPIRATION RATE: 18 BRPM | SYSTOLIC BLOOD PRESSURE: 124 MMHG

## 2024-02-06 DIAGNOSIS — F32.5 DEPRESSION, MAJOR, IN REMISSION (HCC): ICD-10-CM

## 2024-02-06 DIAGNOSIS — N18.31 STAGE 3A CHRONIC KIDNEY DISEASE: ICD-10-CM

## 2024-02-06 DIAGNOSIS — I70.0 ATHEROSCLEROSIS OF AORTA (HCC): ICD-10-CM

## 2024-02-06 DIAGNOSIS — R19.7 DIARRHEA OF PRESUMED INFECTIOUS ORIGIN: ICD-10-CM

## 2024-02-06 PROCEDURE — 3074F SYST BP LT 130 MM HG: CPT | Performed by: FAMILY MEDICINE

## 2024-02-06 PROCEDURE — 3078F DIAST BP <80 MM HG: CPT | Performed by: FAMILY MEDICINE

## 2024-02-06 PROCEDURE — 99213 OFFICE O/P EST LOW 20 MIN: CPT | Performed by: FAMILY MEDICINE

## 2024-02-06 ASSESSMENT — PATIENT HEALTH QUESTIONNAIRE - PHQ9
4. FEELING TIRED OR HAVING LITTLE ENERGY: NOT AT ALL
SUM OF ALL RESPONSES TO PHQ9 QUESTIONS 1 AND 2: 0
2. FEELING DOWN, DEPRESSED, IRRITABLE, OR HOPELESS: NOT AT ALL
8. MOVING OR SPEAKING SO SLOWLY THAT OTHER PEOPLE COULD HAVE NOTICED. OR THE OPPOSITE, BEING SO FIGETY OR RESTLESS THAT YOU HAVE BEEN MOVING AROUND A LOT MORE THAN USUAL: NOT AT ALL
3. TROUBLE FALLING OR STAYING ASLEEP OR SLEEPING TOO MUCH: NOT AT ALL
SUM OF ALL RESPONSES TO PHQ QUESTIONS 1-9: 0
6. FEELING BAD ABOUT YOURSELF - OR THAT YOU ARE A FAILURE OR HAVE LET YOURSELF OR YOUR FAMILY DOWN: NOT AL ALL
7. TROUBLE CONCENTRATING ON THINGS, SUCH AS READING THE NEWSPAPER OR WATCHING TELEVISION: NOT AT ALL
9. THOUGHTS THAT YOU WOULD BE BETTER OFF DEAD, OR OF HURTING YOURSELF: NOT AT ALL
1. LITTLE INTEREST OR PLEASURE IN DOING THINGS: NOT AT ALL
5. POOR APPETITE OR OVEREATING: NOT AT ALL

## 2024-02-06 ASSESSMENT — FIBROSIS 4 INDEX: FIB4 SCORE: 3.26

## 2024-02-06 NOTE — ASSESSMENT & PLAN NOTE
This is a chronic problem.  Patient had slight worsening of her GFR recently with her severe diarrhea episode but I am sure it is back to normal now.  They do not want to check it today.  Will recheck it when we do her next set of labs.

## 2024-02-06 NOTE — ASSESSMENT & PLAN NOTE
This is a chronic problem.  Noted on x-ray study.  She is on a healthy diet.  Does not want to take a statin.

## 2024-02-06 NOTE — PROGRESS NOTES
Subjective:     CC: Here for follow-up on ER visit.    HPI:   Kayode presents today with the following medical concerns:    Diarrhea of presumed infectious origin  This is an acute resolved problem.  Patient is here for follow-up on emergency room visit about 2 weeks ago for severe diarrhea and left upper abdominal pain.  She had a complete workup done which was nonrevealing.  The CT scan did show what was said to be a swirling of the mesentery vessels up in the left upper quadrant and a little edema into the jejunum but nothing to suggest an obstruction or other problems.  Patient got better after IV hydration and has had no trouble since then.  She did have some mild abnormalities in her lab work that most likely have resolved and now that she is back to normal diet and no more diarrhea.    Stage 3a chronic kidney disease (HCC)  This is a chronic problem.  Patient had slight worsening of her GFR recently with her severe diarrhea episode but I am sure it is back to normal now.  They do not want to check it today.  Will recheck it when we do her next set of labs.    Depression, major, in remission (HCC)  This is a chronic issues and remains in remission.    Atherosclerosis of aorta (HCC)  This is a chronic problem.  Noted on x-ray study.  She is on a healthy diet.  Does not want to take a statin.    Past Medical History:   Diagnosis Date    Hip pain 1/22/2014    Mild depression        Social History     Tobacco Use    Smoking status: Never    Smokeless tobacco: Never   Vaping Use    Vaping Use: Never used   Substance Use Topics    Alcohol use: Yes     Comment: wine occ    Drug use: Never       Current Outpatient Medications Ordered in Epic   Medication Sig Dispense Refill    vitamin D (D3-1000) 1000 UNIT Tab Take 2 Tablets by mouth every day.      Ascorbic Acid (VITAMIN C) 1000 MG Tab Take  by mouth.      therapeutic multivitamin-minerals (THERAGRAN-M) Tab Take 1 Tablet by mouth every day.       No current  "[facility]-ordered facility-administered medications on file.       Allergies:  Amoxicillin, Novocain, Other food, Bloodless, Peanut-derived, and Red dye #40 [fd&c red #40]    Health Maintenance: Completed    ROS:  Gen: no fevers/chills, no changes in weight  Eyes: no changes in vision  ENT: no sore throat, no hearing loss, no bloody nose  Pulm: no sob, no cough  CV: no chest pain, no palpitations  GI: no nausea/vomiting, no diarrhea  : no dysuria  MSk: no myalgias  Skin: no rash  Neuro: no headaches, no numbness/tingling  Heme/Lymph: no easy bruising      Objective:       Exam:  /72 (BP Location: Right arm, Patient Position: Sitting, BP Cuff Size: Adult)   Pulse 74   Temp 36.6 °C (97.8 °F) (Temporal)   Resp 18   Ht 1.651 m (5' 5\")   Wt 93.9 kg (207 lb)   SpO2 96%   BMI 34.45 kg/m²  Body mass index is 34.45 kg/m².    Gen: Alert and oriented, No apparent distress.  Neck: Neck is supple without lymphadenopathy.  Lungs: Normal effort, CTA bilaterally, no wheezes, rhonchi, or rales  CV: Regular rate and rhythm. No murmurs, rubs, or gallops.  Abdomen: Benign  Ext: No clubbing, cyanosis, edema.    This is a chronic issue.  Continue to follow.  Continue healthy diet.  Labs: Reviewed    Assessment & Plan:     89 y.o. female with the following -     1. Atherosclerosis of aorta (HCC)  This is a chronic problem.  Continue to be on healthy diet.    2. Stage 3a chronic kidney disease (HCC)  This is a chronic problem.  Continue to avoid NSAIDs.  Will recheck her labs in about 6 months.    3. Depression, major, in remission (HCC)  This is a chronic problem in remission.    4. Diarrhea of presumed infectious origin  This is an acute resolved problem.  I went over all the results with daughter and patient.  No further testing needed at this time unless she has recurrences of it.     Daughter also had some concerns about the patient's hearing.  I offered a referral but she declined.    [facility] Gap Form    Diagnosis to address: " I70.0 - Atherosclerosis of aorta (HCC)  Assessment and plan: Chronic, stable. Continue with current defined treatment plan: Stable condition.  Patient is on a healthy diet.. Follow-up at least annually.  Diagnosis: N18.31 - Stage 3a chronic kidney disease (HCC)  Assessment and plan: Chronic, stable. Continue with current defined treatment plan: Stable condition.  Patient knows to avoid NSAIDs.. Follow-up at least annually.  Diagnosis: F32.5 - Depression, major, in remission (HCC)  Assessment and plan: Chronic, stable. Continue with current defined treatment plan: Remains in remission.. Follow-up at least annually.  Last edited 02/06/24 13:14 PST by Joby Gusman III, M.D.         Return in about 6 months (around 8/6/2024) for Long.    Please note that this dictation was created using voice recognition software. I have made every reasonable attempt to correct obvious errors, but I expect that there are errors of grammar and possibly content that I did not discover before finalizing the note.

## 2024-02-09 ENCOUNTER — PATIENT OUTREACH (OUTPATIENT)
Dept: HEALTH INFORMATION MANAGEMENT | Facility: OTHER | Age: 89
End: 2024-02-09

## 2024-02-09 DIAGNOSIS — R06.9 ABNORMAL RESPIRATIONS: ICD-10-CM

## 2024-02-09 DIAGNOSIS — R41.3 MEMORY IMPAIRMENT: ICD-10-CM

## 2024-02-09 NOTE — PROGRESS NOTES
Left message for patient regarding PCM enrollment awaiting call back. Closing encounter. Patient recent OV with PCP 2/11/2025

## 2024-05-24 ENCOUNTER — DOCUMENTATION (OUTPATIENT)
Dept: HEALTH INFORMATION MANAGEMENT | Facility: OTHER | Age: 89
End: 2024-05-24

## 2024-08-06 ENCOUNTER — OFFICE VISIT (OUTPATIENT)
Dept: MEDICAL GROUP | Facility: PHYSICIAN GROUP | Age: 89
End: 2024-08-06
Payer: MEDICARE

## 2024-08-06 ENCOUNTER — HOSPITAL ENCOUNTER (OUTPATIENT)
Dept: LAB | Facility: MEDICAL CENTER | Age: 89
End: 2024-08-06
Attending: FAMILY MEDICINE
Payer: MEDICARE

## 2024-08-06 VITALS
DIASTOLIC BLOOD PRESSURE: 66 MMHG | WEIGHT: 206 LBS | HEART RATE: 72 BPM | SYSTOLIC BLOOD PRESSURE: 126 MMHG | HEIGHT: 66 IN | TEMPERATURE: 98.9 F | BODY MASS INDEX: 33.11 KG/M2 | OXYGEN SATURATION: 96 % | RESPIRATION RATE: 16 BRPM

## 2024-08-06 DIAGNOSIS — N18.31 STAGE 3A CHRONIC KIDNEY DISEASE: ICD-10-CM

## 2024-08-06 DIAGNOSIS — E83.51 HYPOCALCEMIA: ICD-10-CM

## 2024-08-06 DIAGNOSIS — R54 FRAIL ELDERLY: ICD-10-CM

## 2024-08-06 PROBLEM — R19.7 DIARRHEA OF PRESUMED INFECTIOUS ORIGIN: Status: RESOLVED | Noted: 2024-02-06 | Resolved: 2024-08-06

## 2024-08-06 PROBLEM — Z00.00 MEDICARE ANNUAL WELLNESS VISIT, SUBSEQUENT: Status: RESOLVED | Noted: 2023-09-27 | Resolved: 2024-08-06

## 2024-08-06 LAB
ALBUMIN SERPL BCP-MCNC: 4 G/DL (ref 3.2–4.9)
ALBUMIN/GLOB SERPL: 1.4 G/DL
ALP SERPL-CCNC: 51 U/L (ref 30–99)
ALT SERPL-CCNC: 11 U/L (ref 2–50)
ANION GAP SERPL CALC-SCNC: 13 MMOL/L (ref 7–16)
AST SERPL-CCNC: 15 U/L (ref 12–45)
BILIRUB SERPL-MCNC: 0.4 MG/DL (ref 0.1–1.5)
BUN SERPL-MCNC: 24 MG/DL (ref 8–22)
CALCIUM ALBUM COR SERPL-MCNC: 9.6 MG/DL (ref 8.5–10.5)
CALCIUM SERPL-MCNC: 9.6 MG/DL (ref 8.5–10.5)
CHLORIDE SERPL-SCNC: 108 MMOL/L (ref 96–112)
CO2 SERPL-SCNC: 23 MMOL/L (ref 20–33)
CREAT SERPL-MCNC: 0.96 MG/DL (ref 0.5–1.4)
GFR SERPLBLD CREATININE-BSD FMLA CKD-EPI: 56 ML/MIN/1.73 M 2
GLOBULIN SER CALC-MCNC: 2.9 G/DL (ref 1.9–3.5)
GLUCOSE SERPL-MCNC: 93 MG/DL (ref 65–99)
POTASSIUM SERPL-SCNC: 4.5 MMOL/L (ref 3.6–5.5)
PROT SERPL-MCNC: 6.9 G/DL (ref 6–8.2)
SODIUM SERPL-SCNC: 144 MMOL/L (ref 135–145)

## 2024-08-06 PROCEDURE — 3078F DIAST BP <80 MM HG: CPT | Performed by: FAMILY MEDICINE

## 2024-08-06 PROCEDURE — 3074F SYST BP LT 130 MM HG: CPT | Performed by: FAMILY MEDICINE

## 2024-08-06 PROCEDURE — 80053 COMPREHEN METABOLIC PANEL: CPT

## 2024-08-06 PROCEDURE — 99213 OFFICE O/P EST LOW 20 MIN: CPT | Performed by: FAMILY MEDICINE

## 2024-08-06 PROCEDURE — G2211 COMPLEX E/M VISIT ADD ON: HCPCS | Performed by: FAMILY MEDICINE

## 2024-08-06 PROCEDURE — 36415 COLL VENOUS BLD VENIPUNCTURE: CPT

## 2024-08-06 ASSESSMENT — FIBROSIS 4 INDEX: FIB4 SCORE: 3.29

## 2024-08-06 NOTE — PROGRESS NOTES
Subjective:     CC: Here for follow-up on several issues.    HPI:   KRISHNA presents today with the following medical concerns:    Stage 3a chronic kidney disease (HCC)  This is a chronic problem.  Patient is here today to also have her labs rechecked to see if it is improved.  Will send her for that today.    Hypocalcemia  This is a new problem.  Noted on her last tests done through the emergency room.  Will recheck to see if it is returned to normal.  During that ER visit she had been having diarrhea that is also resolved.    Chronic midline low back pain without sciatica  This is a chronic problem.  Patient does use a cane for support and states there has not been any change in her low back pain.    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  Patient has multiple issues causing her frailty.  Mostly related to memory issues as well as her arthritic symptoms.    Past Medical History:   Diagnosis Date    Hip pain 1/22/2014    Mild depression        Social History     Tobacco Use    Smoking status: Never    Smokeless tobacco: Never   Vaping Use    Vaping status: Never Used   Substance Use Topics    Alcohol use: Yes     Comment: wine occ    Drug use: Never       Current Outpatient Medications Ordered in Epic   Medication Sig Dispense Refill    vitamin D (D3-1000) 1000 UNIT Tab Take 2 Tablets by mouth every day.      Ascorbic Acid (VITAMIN C) 1000 MG Tab Take  by mouth.      therapeutic multivitamin-minerals (THERAGRAN-M) Tab Take 1 Tablet by mouth every day.       No current [facility]-ordered facility-administered medications on file.       Allergies:  Amoxicillin, Novocain, Other food, Bloodless, Peanut-derived, and Red dye #40 [fd&c red #40]    Health Maintenance: Completed    ROS:  Gen: no fevers/chills, no changes in weight  Eyes: no changes in vision  ENT: no sore throat, no hearing loss, no bloody nose  Pulm: no sob, no cough  CV: no chest pain, no palpitations  GI: no nausea/vomiting, no diarrhea  : no dysuria  MSk: no  "myalgias  Skin: no rash  Neuro: no headaches, no numbness/tingling  Heme/Lymph: no easy bruising      Objective:       Exam:  /66 (BP Location: Right arm, Patient Position: Sitting, BP Cuff Size: Adult)   Pulse 72   Temp 37.2 °C (98.9 °F) (Temporal)   Resp 16   Ht 1.676 m (5' 6\")   Wt 93.4 kg (206 lb)   SpO2 96%   BMI 33.25 kg/m²  Body mass index is 33.25 kg/m².    Gen: Alert.  No apparent distress.  Eyes:   Extraocular motions intact.  No scleral icterus seen.  Ears:    Ear canals and TMs are clear.  Neck: Neck is supple without lymphadenopathy.  Lungs: Normal effort, CTA bilaterally, no wheezes, rhonchi, or rales  CV: Regular rate and rhythm. No murmurs, rubs, or gallops.  No carotid bruits heard.  Abdomen: Soft, nontender, no organomegaly or masses.  Ext: No clubbing, cyanosis, edema.  Neuro: Cranial nerves II through VIII are grossly intact.  No lateralizing signs are seen.    Labs: Reviewed and ordered    Assessment & Plan:     89 y.o. female with the following -     1. Stage 3a chronic kidney disease  This is a chronic problem.  Continue to avoid NSAIDs.  Labs ordered for follow-up.  - Comp Metabolic Panel; Future  - ESTIMATED GFR; Future    2. Hypocalcemia  This is a new issue.  Will check labs and see if it is returned to normal.  If not further workup will be done.  - Comp Metabolic Panel; Future    3. Frail elderly  This is a chronic problem.  Continue to follow.      Return in about 6 months (around 2/6/2025) for Long.    Please note that this dictation was created using voice recognition software. I have made every reasonable attempt to correct obvious errors, but I expect that there are errors of grammar and possibly content that I did not discover before finalizing the note.        "

## 2024-08-06 NOTE — ASSESSMENT & PLAN NOTE
This is a chronic issue.  Patient has multiple issues causing her frailty.  Mostly related to memory issues as well as her arthritic symptoms.

## 2024-08-06 NOTE — ASSESSMENT & PLAN NOTE
This is a chronic problem.  Patient does use a cane for support and states there has not been any change in her low back pain.

## 2024-08-06 NOTE — ASSESSMENT & PLAN NOTE
This is a new problem.  Noted on her last tests done through the emergency room.  Will recheck to see if it is returned to normal.  During that ER visit she had been having diarrhea that is also resolved.

## 2024-08-06 NOTE — ASSESSMENT & PLAN NOTE
This is a chronic problem.  Patient is here today to also have her labs rechecked to see if it is improved.  Will send her for that today.

## 2025-01-11 NOTE — ASSESSMENT & PLAN NOTE
This is an acute resolved problem.  Patient is here for follow-up on emergency room visit about 2 weeks ago for severe diarrhea and left upper abdominal pain.  She had a complete workup done which was nonrevealing.  The CT scan did show what was said to be a swirling of the mesentery vessels up in the left upper quadrant and a little edema into the jejunum but nothing to suggest an obstruction or other problems.  Patient got better after IV hydration and has had no trouble since then.  She did have some mild abnormalities in her lab work that most likely have resolved and now that she is back to normal diet and no more diarrhea.   no nasal congestion/no throat pain no nasal congestion/no dry mouth

## 2025-01-14 ENCOUNTER — OFFICE VISIT (OUTPATIENT)
Dept: URGENT CARE | Facility: PHYSICIAN GROUP | Age: OVER 89
End: 2025-01-14
Payer: MEDICARE

## 2025-01-14 ENCOUNTER — TELEPHONE (OUTPATIENT)
Dept: URGENT CARE | Facility: PHYSICIAN GROUP | Age: OVER 89
End: 2025-01-14

## 2025-01-14 ENCOUNTER — HOSPITAL ENCOUNTER (OUTPATIENT)
Dept: RADIOLOGY | Facility: MEDICAL CENTER | Age: OVER 89
End: 2025-01-14
Attending: PHYSICIAN ASSISTANT
Payer: MEDICARE

## 2025-01-14 VITALS
OXYGEN SATURATION: 96 % | WEIGHT: 202 LBS | TEMPERATURE: 97.6 F | RESPIRATION RATE: 16 BRPM | HEIGHT: 66 IN | SYSTOLIC BLOOD PRESSURE: 160 MMHG | DIASTOLIC BLOOD PRESSURE: 68 MMHG | HEART RATE: 70 BPM | BODY MASS INDEX: 32.47 KG/M2

## 2025-01-14 DIAGNOSIS — J06.9 ACUTE UPPER RESPIRATORY INFECTION, UNSPECIFIED: ICD-10-CM

## 2025-01-14 DIAGNOSIS — R05.9 COUGH, UNSPECIFIED TYPE: ICD-10-CM

## 2025-01-14 DIAGNOSIS — R05.9 COUGH, UNSPECIFIED TYPE: Primary | ICD-10-CM

## 2025-01-14 LAB
FLUAV RNA SPEC QL NAA+PROBE: NEGATIVE
FLUBV RNA SPEC QL NAA+PROBE: NEGATIVE
RSV RNA SPEC QL NAA+PROBE: NEGATIVE
SARS-COV-2 RNA RESP QL NAA+PROBE: NEGATIVE

## 2025-01-14 PROCEDURE — 3077F SYST BP >= 140 MM HG: CPT | Performed by: PHYSICIAN ASSISTANT

## 2025-01-14 PROCEDURE — 3078F DIAST BP <80 MM HG: CPT | Performed by: PHYSICIAN ASSISTANT

## 2025-01-14 PROCEDURE — 0241U POCT CEPHEID COV-2, FLU A/B, RSV - PCR: CPT | Performed by: PHYSICIAN ASSISTANT

## 2025-01-14 PROCEDURE — 99214 OFFICE O/P EST MOD 30 MIN: CPT | Performed by: PHYSICIAN ASSISTANT

## 2025-01-14 PROCEDURE — 71046 X-RAY EXAM CHEST 2 VIEWS: CPT

## 2025-01-14 ASSESSMENT — FIBROSIS 4 INDEX: FIB4 SCORE: 2.56

## 2025-01-15 ASSESSMENT — ENCOUNTER SYMPTOMS
STRIDOR: 0
SPUTUM PRODUCTION: 1
FEVER: 0
CHILLS: 0
COUGH: 1

## 2025-01-15 NOTE — PROGRESS NOTES
"Subjective     Kayode Winters is a 89 y.o. female who presents with Shortness of Breath (X a couple of weeks and states that today has been the worse)    PMH:  has a past medical history of Hip pain (1/22/2014) and Mild depression.    She has no past medical history of Arthritis, ASTHMA, Breast cancer (HCC), Diabetic neuropathy (HCC), or GERD (gastroesophageal reflux disease).  MEDS:   Current Outpatient Medications:     vitamin D (D3-1000) 1000 UNIT Tab, Take 2 Tablets by mouth every day., Disp: , Rfl:     Ascorbic Acid (VITAMIN C) 1000 MG Tab, Take  by mouth., Disp: , Rfl:     therapeutic multivitamin-minerals (THERAGRAN-M) Tab, Take 1 Tablet by mouth every day., Disp: , Rfl:   ALLERGIES:   Allergies   Allergen Reactions    Amoxicillin Unspecified     Per pt states to getting extreme weakness and pt could not move much, this then got followed by body aches    Novocain Contact Dermatitis    Other Food Contact Dermatitis     Pt is allergic to peanuts and peanut products.    Bloodless     Peanut-Derived     Red Dye #40 [Fd&C Red #40]      SURGHX:   Past Surgical History:   Procedure Laterality Date    APPENDECTOMY       SOCHX:  reports that she has never smoked. She has never used smokeless tobacco. She reports current alcohol use. She reports that she does not use drugs.  FH: Reviewed with patient, not pertinent to this visit.           Patient brought in by her daughter for evaluation of wheezy sounding cough for the last week or so. PT states she has had a cough that is intermittently productive.  Family members (son) had covid a few weeks ago, pt tested negative at that time. PT denies fever, chills, sore throat, congestion or headache.  Pt does not particularly feel SOB but daughter is concerned by the \"whistling sound when she walks\".  Pulse ox at home went down to 91 today so she brought her in for evaluation.  Pt denies swelling of BLE or sudden weight gain.   No other complaints.          Cough  This " "is a new problem. The current episode started 1 to 4 weeks ago. Pertinent negatives include no chills or fever.       Review of Systems   Constitutional:  Negative for chills and fever.   HENT:  Positive for congestion.    Respiratory:  Positive for cough and sputum production. Negative for stridor.    All other systems reviewed and are negative.             Objective     BP (!) 160/68 (BP Location: Left arm, Patient Position: Sitting, BP Cuff Size: Adult long)   Pulse 70   Temp 36.4 °C (97.6 °F) (Temporal)   Resp 16   Ht 1.676 m (5' 6\")   Wt 91.6 kg (202 lb)   SpO2 96%   BMI 32.60 kg/m²      Physical Exam  Vitals and nursing note reviewed.   Constitutional:       General: She is not in acute distress.     Appearance: Normal appearance. She is well-developed and normal weight. She is not ill-appearing, toxic-appearing or diaphoretic.   HENT:      Head: Normocephalic and atraumatic.      Right Ear: Tympanic membrane normal.      Left Ear: Tympanic membrane normal.      Nose: Nose normal.      Mouth/Throat:      Lips: Pink.      Mouth: Mucous membranes are moist.      Pharynx: Oropharynx is clear. Uvula midline.   Eyes:      Extraocular Movements: Extraocular movements intact.      Conjunctiva/sclera: Conjunctivae normal.      Pupils: Pupils are equal, round, and reactive to light.   Cardiovascular:      Rate and Rhythm: Normal rate and regular rhythm.      Pulses: Normal pulses.      Heart sounds: Normal heart sounds.   Pulmonary:      Effort: Pulmonary effort is normal.      Breath sounds: Normal breath sounds. No wheezing, rhonchi or rales.   Abdominal:      General: Bowel sounds are normal.      Palpations: Abdomen is soft.   Musculoskeletal:         General: Normal range of motion.      Cervical back: Normal range of motion and neck supple.      Right lower leg: No edema.      Left lower leg: No edema.   Skin:     General: Skin is warm and dry.      Capillary Refill: Capillary refill takes less than 2 " seconds.   Neurological:      General: No focal deficit present.      Mental Status: She is alert and oriented to person, place, and time.      Cranial Nerves: No cranial nerve deficit.      Motor: Motor function is intact.      Coordination: Coordination is intact.      Gait: Gait normal.   Psychiatric:         Mood and Affect: Mood normal.                             Assessment & Plan        Assessment & Plan  Cough, unspecified type    Orders:    POCT CoV-2, Flu A/B, RSV by PCR    DX-CHEST-2 VIEWS; Future    Acute upper respiratory infection, unspecified    Orders:    POCT CoV-2, Flu A/B, RSV by PCR    DX-CHEST-2 VIEWS; Future              Results for orders placed or performed in visit on 01/14/25   POCT CoV-2, Flu A/B, RSV by PCR    Collection Time: 01/14/25  5:45 PM   Result Value Ref Range    SARS-CoV-2 by PCR Negative Negative, Invalid    Influenza virus A RNA Negative Negative, Invalid    Influenza virus B, PCR Negative Negative, Invalid    RSV, PCR Negative Negative, Invalid          RADIOLOGY RESULTS   DX-CHEST-2 VIEWS    Result Date: 1/14/2025 1/14/2025 5:50 PM HISTORY/REASON FOR EXAM:  Cough. TECHNIQUE/EXAM DESCRIPTION AND NUMBER OF VIEWS: Two views of the chest. COMPARISON:  11/16/2022 FINDINGS: Cardiomediastinal silhouette is stable. Aortic calcified atherosclerotic plaque. No focal consolidation, pleural effusion, pulmonary edema or pneumothorax. No acute osseous abnormality.     No acute cardiopulmonary abnormality.         PT HPI and PE are consistent with viral URI.  As anticipated, viral testing is negative at this time.   PT herself did not report SOB or CHRISTIANSEN, and in fact, did not cough during office visit.  I encouraged OTC cough syrup as needed.      PT has appointment with PCP in 2 days, encouraged her to attend this appointment in the event her PCP would like to do any further testing.    Differential diagnosis, supportive care, and indications for immediate follow-up discussed with patient.   Instructed to return to clinic or nearest emergency department for any change in condition, further concerns, or worsening of symptoms.    I personally reviewed prior external notes and test results pertinent to today's visit.  I have independently reviewed and interpreted all diagnostics ordered during this urgent care visit.    PT should follow up with PCP in 1-2 days for re-evaluation if symptoms have not improved.      Discussed red flags and reasons to return to UC or ED.      Pt and/or family verbalized understanding of diagnosis and follow up instructions and was offered informational handout on diagnosis.  PT discharged.     Please note that this dictation was created using voice recognition software. I have made every reasonable attempt to correct obvious errors, but I expect that there may be errors of grammar and possibly content that I did not discover before finalizing the note.     Ambreen   [phone]

## 2025-01-16 ENCOUNTER — APPOINTMENT (OUTPATIENT)
Dept: MEDICAL GROUP | Facility: PHYSICIAN GROUP | Age: OVER 89
End: 2025-01-16
Payer: MEDICARE

## 2025-02-11 ENCOUNTER — OFFICE VISIT (OUTPATIENT)
Dept: MEDICAL GROUP | Facility: PHYSICIAN GROUP | Age: OVER 89
End: 2025-02-11
Payer: MEDICARE

## 2025-02-11 VITALS
RESPIRATION RATE: 16 BRPM | BODY MASS INDEX: 33.11 KG/M2 | TEMPERATURE: 97.8 F | DIASTOLIC BLOOD PRESSURE: 72 MMHG | SYSTOLIC BLOOD PRESSURE: 130 MMHG | HEART RATE: 58 BPM | OXYGEN SATURATION: 97 % | WEIGHT: 206 LBS | HEIGHT: 66 IN

## 2025-02-11 DIAGNOSIS — Z00.00 ADULT GENERAL MEDICAL EXAM: ICD-10-CM

## 2025-02-11 DIAGNOSIS — R06.9 ABNORMAL RESPIRATIONS: ICD-10-CM

## 2025-02-11 DIAGNOSIS — N18.31 STAGE 3A CHRONIC KIDNEY DISEASE: ICD-10-CM

## 2025-02-11 PROBLEM — E83.51 HYPOCALCEMIA: Status: RESOLVED | Noted: 2024-08-06 | Resolved: 2025-02-11

## 2025-02-11 PROCEDURE — 3078F DIAST BP <80 MM HG: CPT | Performed by: FAMILY MEDICINE

## 2025-02-11 PROCEDURE — 3075F SYST BP GE 130 - 139MM HG: CPT | Performed by: FAMILY MEDICINE

## 2025-02-11 PROCEDURE — 99213 OFFICE O/P EST LOW 20 MIN: CPT | Performed by: FAMILY MEDICINE

## 2025-02-11 ASSESSMENT — PATIENT HEALTH QUESTIONNAIRE - PHQ9: CLINICAL INTERPRETATION OF PHQ2 SCORE: 0

## 2025-02-11 ASSESSMENT — FIBROSIS 4 INDEX: FIB4 SCORE: 2.56

## 2025-02-11 NOTE — ASSESSMENT & PLAN NOTE
This is a new problem.  For about the last 4 to 6 weeks both her daughters have noted that the patient is having some abnormal respirations at time.  Looks like she is pursing her lips and short of breath but the patient denies feeling short of breath.  She denies any chest pains or cough.  The daughter has not noticed it for the last 2 days.  It does tend to come and go and does not seem to be related to activity.  Patient feels no different when she lays down at night.

## 2025-02-11 NOTE — PROGRESS NOTES
Subjective:     CC: Here for couple of issues.    HPI:   Kayode presents today with the following medical concerns:    Abnormal respirations  This is a new problem.  For about the last 4 to 6 weeks both her daughters have noted that the patient is having some abnormal respirations at time.  Looks like she is pursing her lips and short of breath but the patient denies feeling short of breath.  She denies any chest pains or cough.  The daughter has not noticed it for the last 2 days.  It does tend to come and go and does not seem to be related to activity.  Patient feels no different when she lays down at night.    Stage 3a chronic kidney disease (HCC)  This is a chronic problem.  She knows to avoid NSAIDs.  Labs will be ordered for follow-up.    Adult general medical exam  Patient is here for her exam as well.  Will also get her baseline labs.    Past Medical History:   Diagnosis Date    Hip pain 1/22/2014    Mild depression        Social History     Tobacco Use    Smoking status: Never    Smokeless tobacco: Never   Vaping Use    Vaping status: Never Used   Substance Use Topics    Alcohol use: Yes     Comment: wine occ    Drug use: Never       Current Outpatient Medications Ordered in Epic   Medication Sig Dispense Refill    vitamin D (D3-1000) 1000 UNIT Tab Take 2 Tablets by mouth every day.      Ascorbic Acid (VITAMIN C) 1000 MG Tab Take  by mouth.      therapeutic multivitamin-minerals (THERAGRAN-M) Tab Take 1 Tablet by mouth every day.       No current [facility]-ordered facility-administered medications on file.       Allergies:  Amoxicillin, Novocain, Other food, Bloodless, Peanut-derived, and Red dye #40 [fd&c red #40]    Health Maintenance: Completed    ROS:  Gen: no fevers/chills, no changes in weight  Eyes: no changes in vision  ENT: no sore throat, no hearing loss, no bloody nose  Pulm: no sob, no cough  CV: no chest pain, no palpitations  GI: no nausea/vomiting, no diarrhea  : no dysuria  MSk: no  "myalgias  Skin: no rash  Neuro: no headaches, no numbness/tingling  Heme/Lymph: no easy bruising      Objective:       Exam:  /72 (BP Location: Right arm, Patient Position: Sitting, BP Cuff Size: Adult)   Pulse (!) 58   Temp 36.6 °C (97.8 °F) (Temporal)   Resp 16   Ht 1.676 m (5' 6\")   Wt 93.4 kg (206 lb)   SpO2 97%   BMI 33.25 kg/m²  Body mass index is 33.25 kg/m².    Gen: Alert and oriented, No apparent distress.  Neck: Neck is supple without lymphadenopathy.  Lungs: Normal effort, CTA bilaterally, no wheezes, rhonchi, or rales  CV: Regular rate and rhythm. No murmurs, rubs, or gallops.  Abdomen: Benign  Ext: No clubbing, cyanosis, edema.  Neuro: Cranial nerves II through VIII are grossly intact.    Labs: Ordered    Assessment & Plan:     89 y.o. female with the following -     1. Adult general medical exam  Patient is exam appears unremarkable.  Baseline labs ordered.  - Comp Metabolic Panel; Future  - Lipid Profile; Future  - URINALYSIS,CULTURE IF INDICATED; Future  - CBC WITH DIFFERENTIAL; Future  - ESTIMATED GFR; Future    2. Stage 3a chronic kidney disease  This is a chronic problem.  Continue to avoid NSAIDs.  Labs ordered.  - Comp Metabolic Panel; Future  - CBC WITH DIFFERENTIAL; Future  - ESTIMATED GFR; Future    3. Abnormal respirations  This is a new problem.  Will see what her baseline labs show.  She just had a chest x-ray done a month ago that was normal.  I will have daughter try to monitor her pulse when she has one of the abnormal breathing episodes to see if there is any irregularity there.  If her labs are normal and she keeps having these episodes we could refer her to cardiology to get a Holter monitor and other workup to rule out heart disease.    [facility]    Diagnosis to address: N18.31 - Stage 3a chronic kidney disease  Assessment and plan: Chronic, stable. Continue with current defined treatment plan: Chronic stable condition.  Avoid NSAIDs.. Follow-up at least " annually.  Last edited 02/11/25 13:18 PST by Joby Gusman III, M.D.         Return in about 6 months (around 8/11/2025) for Long.    Please note that this dictation was created using voice recognition software. I have made every reasonable attempt to correct obvious errors, but I expect that there are errors of grammar and possibly content that I did not discover before finalizing the note.

## 2025-02-11 NOTE — ASSESSMENT & PLAN NOTE
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  All allergies and medications reviewed.  Patient would like communication of their results via:    [employer]    Cell Phone:   Telephone Information:   Mobile [phone]     Okay to leave a message containing results? Yes      Rooming documentation of social history includes tobacco screening only.   Patient is here for her exam as well.  Will also get her baseline labs.

## 2025-02-20 ENCOUNTER — HOSPITAL ENCOUNTER (OUTPATIENT)
Dept: LAB | Facility: MEDICAL CENTER | Age: OVER 89
End: 2025-02-20
Attending: FAMILY MEDICINE
Payer: MEDICARE

## 2025-02-20 DIAGNOSIS — Z00.00 ADULT GENERAL MEDICAL EXAM: ICD-10-CM

## 2025-02-20 DIAGNOSIS — N18.31 STAGE 3A CHRONIC KIDNEY DISEASE: ICD-10-CM

## 2025-02-20 LAB
APPEARANCE UR: CLEAR
BACTERIA #/AREA URNS HPF: ABNORMAL /HPF
BASOPHILS # BLD AUTO: 0.6 % (ref 0–1.8)
BASOPHILS # BLD: 0.04 K/UL (ref 0–0.12)
BILIRUB UR QL STRIP.AUTO: NEGATIVE
CASTS URNS QL MICRO: ABNORMAL /LPF (ref 0–2)
COLOR UR: YELLOW
EOSINOPHIL # BLD AUTO: 0.06 K/UL (ref 0–0.51)
EOSINOPHIL NFR BLD: 1 % (ref 0–6.9)
EPITHELIAL CELLS 1715: ABNORMAL /HPF (ref 0–5)
ERYTHROCYTE [DISTWIDTH] IN BLOOD BY AUTOMATED COUNT: 48.2 FL (ref 35.9–50)
GLUCOSE UR STRIP.AUTO-MCNC: NEGATIVE MG/DL
HCT VFR BLD AUTO: 41.7 % (ref 37–47)
HGB BLD-MCNC: 13.7 G/DL (ref 12–16)
IMM GRANULOCYTES # BLD AUTO: 0.02 K/UL (ref 0–0.11)
IMM GRANULOCYTES NFR BLD AUTO: 0.3 % (ref 0–0.9)
KETONES UR STRIP.AUTO-MCNC: NEGATIVE MG/DL
LEUKOCYTE ESTERASE UR QL STRIP.AUTO: ABNORMAL
LYMPHOCYTES # BLD AUTO: 2.61 K/UL (ref 1–4.8)
LYMPHOCYTES NFR BLD: 41.6 % (ref 22–41)
MCH RBC QN AUTO: 33 PG (ref 27–33)
MCHC RBC AUTO-ENTMCNC: 32.9 G/DL (ref 32.2–35.5)
MCV RBC AUTO: 100.5 FL (ref 81.4–97.8)
MICRO URNS: ABNORMAL
MONOCYTES # BLD AUTO: 0.49 K/UL (ref 0–0.85)
MONOCYTES NFR BLD AUTO: 7.8 % (ref 0–13.4)
NEUTROPHILS # BLD AUTO: 3.06 K/UL (ref 1.82–7.42)
NEUTROPHILS NFR BLD: 48.7 % (ref 44–72)
NITRITE UR QL STRIP.AUTO: NEGATIVE
NRBC # BLD AUTO: 0 K/UL
NRBC BLD-RTO: 0 /100 WBC (ref 0–0.2)
PH UR STRIP.AUTO: 5.5 [PH] (ref 5–8)
PLATELET # BLD AUTO: 201 K/UL (ref 164–446)
PMV BLD AUTO: 10.9 FL (ref 9–12.9)
PROT UR QL STRIP: NEGATIVE MG/DL
RBC # BLD AUTO: 4.15 M/UL (ref 4.2–5.4)
RBC # URNS HPF: ABNORMAL /HPF (ref 0–2)
RBC UR QL AUTO: NEGATIVE
SP GR UR STRIP.AUTO: 1.02
UROBILINOGEN UR STRIP.AUTO-MCNC: 1 EU/DL
WBC # BLD AUTO: 6.3 K/UL (ref 4.8–10.8)
WBC #/AREA URNS HPF: ABNORMAL /HPF

## 2025-02-20 PROCEDURE — 85025 COMPLETE CBC W/AUTO DIFF WBC: CPT

## 2025-02-20 PROCEDURE — 80061 LIPID PANEL: CPT

## 2025-02-20 PROCEDURE — 81001 URINALYSIS AUTO W/SCOPE: CPT

## 2025-02-20 PROCEDURE — 36415 COLL VENOUS BLD VENIPUNCTURE: CPT

## 2025-02-20 PROCEDURE — 80053 COMPREHEN METABOLIC PANEL: CPT

## 2025-02-21 ENCOUNTER — RESULTS FOLLOW-UP (OUTPATIENT)
Dept: MEDICAL GROUP | Facility: PHYSICIAN GROUP | Age: OVER 89
End: 2025-02-21

## 2025-02-21 LAB
ALBUMIN SERPL BCP-MCNC: 3.9 G/DL (ref 3.2–4.9)
ALBUMIN/GLOB SERPL: 1.3 G/DL
ALP SERPL-CCNC: 47 U/L (ref 30–99)
ALT SERPL-CCNC: 14 U/L (ref 2–50)
ANION GAP SERPL CALC-SCNC: 11 MMOL/L (ref 7–16)
AST SERPL-CCNC: 20 U/L (ref 12–45)
BILIRUB SERPL-MCNC: 0.5 MG/DL (ref 0.1–1.5)
BUN SERPL-MCNC: 19 MG/DL (ref 8–22)
CALCIUM ALBUM COR SERPL-MCNC: 9.3 MG/DL (ref 8.5–10.5)
CALCIUM SERPL-MCNC: 9.2 MG/DL (ref 8.5–10.5)
CHLORIDE SERPL-SCNC: 108 MMOL/L (ref 96–112)
CHOLEST SERPL-MCNC: 214 MG/DL (ref 100–199)
CO2 SERPL-SCNC: 25 MMOL/L (ref 20–33)
CREAT SERPL-MCNC: 1.01 MG/DL (ref 0.5–1.4)
FASTING STATUS PATIENT QL REPORTED: NORMAL
GFR SERPLBLD CREATININE-BSD FMLA CKD-EPI: 52 ML/MIN/1.73 M 2
GLOBULIN SER CALC-MCNC: 2.9 G/DL (ref 1.9–3.5)
GLUCOSE SERPL-MCNC: 90 MG/DL (ref 65–99)
HDLC SERPL-MCNC: 35 MG/DL
LDLC SERPL CALC-MCNC: 152 MG/DL
POTASSIUM SERPL-SCNC: 4.2 MMOL/L (ref 3.6–5.5)
PROT SERPL-MCNC: 6.8 G/DL (ref 6–8.2)
SODIUM SERPL-SCNC: 144 MMOL/L (ref 135–145)
TRIGL SERPL-MCNC: 136 MG/DL (ref 0–149)

## 2025-08-18 ENCOUNTER — OFFICE VISIT (OUTPATIENT)
Dept: MEDICAL GROUP | Facility: PHYSICIAN GROUP | Age: OVER 89
End: 2025-08-18
Payer: MEDICARE

## 2025-08-18 VITALS
BODY MASS INDEX: 32.95 KG/M2 | OXYGEN SATURATION: 96 % | WEIGHT: 205 LBS | RESPIRATION RATE: 16 BRPM | SYSTOLIC BLOOD PRESSURE: 126 MMHG | HEIGHT: 66 IN | HEART RATE: 60 BPM | TEMPERATURE: 98.4 F | DIASTOLIC BLOOD PRESSURE: 68 MMHG

## 2025-08-18 DIAGNOSIS — T14.8XXA SUPERFICIAL ABRASION: ICD-10-CM

## 2025-08-18 DIAGNOSIS — G89.29 CHRONIC MIDLINE LOW BACK PAIN WITHOUT SCIATICA: ICD-10-CM

## 2025-08-18 DIAGNOSIS — M54.50 CHRONIC MIDLINE LOW BACK PAIN WITHOUT SCIATICA: ICD-10-CM

## 2025-08-18 DIAGNOSIS — R29.898 WEAKNESS OF BOTH LEGS: ICD-10-CM

## 2025-08-18 DIAGNOSIS — N18.31 STAGE 3A CHRONIC KIDNEY DISEASE: Primary | ICD-10-CM

## 2025-08-18 PROCEDURE — 3078F DIAST BP <80 MM HG: CPT | Performed by: FAMILY MEDICINE

## 2025-08-18 PROCEDURE — 3074F SYST BP LT 130 MM HG: CPT | Performed by: FAMILY MEDICINE

## 2025-08-18 PROCEDURE — 99214 OFFICE O/P EST MOD 30 MIN: CPT | Performed by: FAMILY MEDICINE

## 2025-08-18 RX ORDER — LIDOCAINE 50 MG/G
1 PATCH TOPICAL EVERY 12 HOURS
Qty: 14 PATCH | Refills: 5 | Status: SHIPPED | OUTPATIENT
Start: 2025-08-18

## 2025-08-18 ASSESSMENT — FIBROSIS 4 INDEX: FIB4 SCORE: 2.45

## 2025-08-22 ENCOUNTER — HOME CARE VISIT (OUTPATIENT)
Dept: HOME HEALTH SERVICES | Facility: HOME HEALTHCARE | Age: OVER 89
End: 2025-08-22

## 2025-08-28 ENCOUNTER — TELEPHONE (OUTPATIENT)
Dept: HOME HEALTH SERVICES | Facility: HOME HEALTHCARE | Age: OVER 89
End: 2025-08-28
Payer: MEDICARE

## 2025-08-31 ENCOUNTER — TELEPHONE (OUTPATIENT)
Dept: HOME HEALTH SERVICES | Facility: HOME HEALTHCARE | Age: OVER 89
End: 2025-08-31
Payer: MEDICARE

## 2025-08-31 ENCOUNTER — HOME CARE VISIT (OUTPATIENT)
Dept: HOME HEALTH SERVICES | Facility: HOME HEALTHCARE | Age: OVER 89
End: 2025-08-31